# Patient Record
Sex: FEMALE | Race: BLACK OR AFRICAN AMERICAN | NOT HISPANIC OR LATINO | URBAN - METROPOLITAN AREA
[De-identification: names, ages, dates, MRNs, and addresses within clinical notes are randomized per-mention and may not be internally consistent; named-entity substitution may affect disease eponyms.]

---

## 2023-04-23 LAB
GRAM STAIN: ABNORMAL
TISSUE/WOUND CULTURE/SMEAR: ABNORMAL

## 2023-08-21 PROBLEM — E78.5 HYPERLIPIDEMIA: Status: ACTIVE | Noted: 2023-08-21

## 2023-08-21 PROBLEM — L85.3 XEROSIS OF SKIN: Status: ACTIVE | Noted: 2023-08-21

## 2023-08-21 PROBLEM — I63.9: Status: ACTIVE | Noted: 2023-08-21

## 2023-08-21 PROBLEM — E88.09 HYPOALBUMINEMIA: Status: ACTIVE | Noted: 2023-08-21

## 2023-08-21 PROBLEM — D64.9 ANEMIA: Status: ACTIVE | Noted: 2023-08-21

## 2023-08-21 PROBLEM — L84 CALLUS OF FOOT: Status: ACTIVE | Noted: 2023-08-21

## 2023-08-21 PROBLEM — R53.81 PHYSICAL DECONDITIONING: Status: ACTIVE | Noted: 2023-08-21

## 2023-08-21 PROBLEM — E55.9 VITAMIN D DEFICIENCY: Status: ACTIVE | Noted: 2023-08-21

## 2023-08-21 PROBLEM — L60.3 DYSTROPHIA UNGUIUM: Status: ACTIVE | Noted: 2023-08-21

## 2023-08-21 PROBLEM — K08.9 POOR DENTITION: Status: ACTIVE | Noted: 2023-08-21

## 2023-08-21 PROBLEM — I25.10 CAD (CORONARY ATHEROSCLEROTIC DISEASE): Status: ACTIVE | Noted: 2023-08-21

## 2023-08-21 PROBLEM — J30.2 SEASONAL ALLERGIES: Status: ACTIVE | Noted: 2023-08-21

## 2023-08-21 PROBLEM — J31.0 CHRONIC RHINITIS: Status: ACTIVE | Noted: 2023-08-21

## 2023-08-21 PROBLEM — F32.A DEPRESSION: Status: ACTIVE | Noted: 2023-08-21

## 2023-08-21 PROBLEM — J30.1 ALLERGIC RHINITIS DUE TO POLLEN: Status: ACTIVE | Noted: 2023-08-21

## 2023-08-21 PROBLEM — F02.80 ALZHEIMER'S DEMENTIA (MULTI): Status: ACTIVE | Noted: 2023-08-21

## 2023-08-21 PROBLEM — R60.0 EDEMA OF LEFT LOWER EXTREMITY: Status: ACTIVE | Noted: 2023-08-21

## 2023-08-21 PROBLEM — M21.619 BUNION: Status: ACTIVE | Noted: 2023-08-21

## 2023-08-21 PROBLEM — G30.9 ALZHEIMER'S DEMENTIA (MULTI): Status: ACTIVE | Noted: 2023-08-21

## 2023-08-21 PROBLEM — I63.9 CVA (CEREBRAL VASCULAR ACCIDENT) (MULTI): Status: ACTIVE | Noted: 2023-08-21

## 2023-08-21 PROBLEM — F19.11 HISTORY OF SUBSTANCE ABUSE (MULTI): Status: ACTIVE | Noted: 2023-08-21
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
## 2023-08-21 PROBLEM — K59.00 CONSTIPATION: Status: ACTIVE | Noted: 2023-08-21

## 2023-08-21 PROBLEM — R01.1 HEART MURMUR: Status: ACTIVE | Noted: 2023-08-21

## 2023-08-21 PROBLEM — J42 CHRONIC BRONCHITIS (MULTI): Status: ACTIVE | Noted: 2023-08-21

## 2023-08-21 PROBLEM — R06.09 DYSPNEA ON EXERTION: Status: ACTIVE | Noted: 2023-08-21

## 2023-08-21 PROBLEM — R63.0 ANOREXIA: Status: ACTIVE | Noted: 2023-08-21

## 2023-08-21 PROBLEM — R09.82 POSTNASAL DRIP: Status: ACTIVE | Noted: 2023-08-21

## 2023-08-21 PROBLEM — M85.80 OSTEOPENIA: Status: ACTIVE | Noted: 2023-08-21

## 2023-08-21 PROBLEM — R41.3 INTERMITTENT MEMORY LOSS: Status: ACTIVE | Noted: 2023-08-21

## 2023-08-21 PROBLEM — I10 BENIGN ESSENTIAL HYPERTENSION: Status: ACTIVE | Noted: 2023-08-21

## 2023-08-21 PROBLEM — E46 PROTEIN CALORIE MALNUTRITION (MULTI): Status: ACTIVE | Noted: 2023-08-21

## 2023-08-21 PROBLEM — R09.89 THROAT CLEARING: Status: ACTIVE | Noted: 2023-08-21

## 2023-08-21 PROBLEM — R79.89 ELEVATED TSH: Status: ACTIVE | Noted: 2023-08-21

## 2023-08-21 PROBLEM — G31.84 MILD COGNITIVE IMPAIRMENT: Status: ACTIVE | Noted: 2023-08-21

## 2023-08-21 PROBLEM — R26.9 IMPAIRED GAIT: Status: ACTIVE | Noted: 2023-08-21

## 2023-08-21 PROBLEM — R56.9: Status: ACTIVE | Noted: 2023-08-21

## 2023-08-21 PROBLEM — R04.0 EPISTAXIS: Status: ACTIVE | Noted: 2023-08-21

## 2023-08-21 RX ORDER — ROSUVASTATIN CALCIUM 20 MG/1
1 TABLET, COATED ORAL DAILY
COMMUNITY

## 2023-08-21 RX ORDER — ASCORBIC ACID 500 MG
1 TABLET ORAL DAILY
COMMUNITY
Start: 2017-06-25

## 2023-08-21 RX ORDER — CITALOPRAM 10 MG/1
1 TABLET ORAL DAILY
COMMUNITY
Start: 2018-05-22 | End: 2023-11-17 | Stop reason: ALTCHOICE

## 2023-08-21 RX ORDER — NAPROXEN SODIUM 220 MG/1
1 TABLET, FILM COATED ORAL DAILY
COMMUNITY

## 2023-08-21 RX ORDER — ALBUTEROL SULFATE 90 UG/1
AEROSOL, METERED RESPIRATORY (INHALATION)
COMMUNITY
Start: 2017-04-03 | End: 2023-11-17 | Stop reason: ALTCHOICE

## 2023-08-21 RX ORDER — RIVASTIGMINE TARTRATE 1.5 MG/1
1 CAPSULE ORAL 2 TIMES DAILY
COMMUNITY
Start: 2018-10-25 | End: 2023-11-17 | Stop reason: ALTCHOICE

## 2023-08-21 RX ORDER — HYDROCORTISONE 25 MG/G
1 OINTMENT TOPICAL 2 TIMES DAILY
COMMUNITY
End: 2023-11-17 | Stop reason: ALTCHOICE

## 2023-08-21 RX ORDER — FERROUS SULFATE 325(65) MG
1 TABLET ORAL 2 TIMES DAILY
COMMUNITY
Start: 2017-06-25 | End: 2023-11-17 | Stop reason: ALTCHOICE

## 2023-08-21 RX ORDER — AMMONIUM LACTATE 12 G/100G
1 CREAM TOPICAL 2 TIMES DAILY PRN
COMMUNITY
End: 2023-11-17 | Stop reason: ALTCHOICE

## 2023-08-21 RX ORDER — LORATADINE 10 MG/1
10 TABLET ORAL AS NEEDED
COMMUNITY
Start: 2016-06-13 | End: 2023-11-17 | Stop reason: ALTCHOICE

## 2023-08-21 RX ORDER — ACETAMINOPHEN 500 MG
50 TABLET ORAL DAILY
COMMUNITY

## 2023-08-21 RX ORDER — LACTOSE-REDUCED FOOD 0.04G-1/ML
LIQUID (ML) ORAL
COMMUNITY
Start: 2018-10-25

## 2023-10-02 ENCOUNTER — OFFICE VISIT (OUTPATIENT)
Dept: VASCULAR SURGERY | Facility: HOSPITAL | Age: 83
End: 2023-10-02
Payer: MEDICARE

## 2023-10-02 VITALS — DIASTOLIC BLOOD PRESSURE: 62 MMHG | SYSTOLIC BLOOD PRESSURE: 99 MMHG

## 2023-10-02 DIAGNOSIS — I83.025: Primary | ICD-10-CM

## 2023-10-02 DIAGNOSIS — L97.521: Primary | ICD-10-CM

## 2023-10-02 PROCEDURE — 1159F MED LIST DOCD IN RCRD: CPT | Performed by: NURSE PRACTITIONER

## 2023-10-02 PROCEDURE — 3078F DIAST BP <80 MM HG: CPT | Performed by: NURSE PRACTITIONER

## 2023-10-02 PROCEDURE — 99214 OFFICE O/P EST MOD 30 MIN: CPT | Performed by: NURSE PRACTITIONER

## 2023-10-02 PROCEDURE — 99204 OFFICE O/P NEW MOD 45 MIN: CPT | Performed by: NURSE PRACTITIONER

## 2023-10-02 PROCEDURE — 3074F SYST BP LT 130 MM HG: CPT | Performed by: NURSE PRACTITIONER

## 2023-10-02 RX ORDER — POTASSIUM &MAGNESIUM ASPARTATE 250-250 MG
500 CAPSULE ORAL DAILY
COMMUNITY

## 2023-10-02 RX ORDER — DOCUSATE SODIUM 100 MG/1
100 CAPSULE, LIQUID FILLED ORAL DAILY PRN
COMMUNITY

## 2023-10-02 RX ORDER — CETIRIZINE HYDROCHLORIDE 10 MG/1
10 TABLET, CHEWABLE ORAL DAILY
COMMUNITY
End: 2023-11-17

## 2023-10-02 RX ORDER — POLYETHYLENE GLYCOL 3350 17 G/17G
17 POWDER, FOR SOLUTION ORAL DAILY PRN
COMMUNITY
End: 2023-11-17

## 2023-10-02 ASSESSMENT — ENCOUNTER SYMPTOMS
PALPITATIONS: 0
FATIGUE: 0
ABDOMINAL PAIN: 0
FEVER: 0
ADENOPATHY: 0
SHORTNESS OF BREATH: 0
NERVOUS/ANXIOUS: 0
NUMBNESS: 0
UNEXPECTED WEIGHT CHANGE: 0
DIARRHEA: 0
CHILLS: 0
VOMITING: 0
NAUSEA: 0
AGITATION: 0
COUGH: 0
FLANK PAIN: 0
CHEST TIGHTNESS: 0
HEADACHES: 0
ARTHRALGIAS: 0
DIZZINESS: 0
WEAKNESS: 0

## 2023-10-02 NOTE — PROGRESS NOTES
History Of Present Illness  Kate Shah is a 83 y.o. female presenting with venous stasis ulcer of left great toe. The patient is unsure as to how long she has had an ulcer in her foot and was not aware that she had any issues with her feet. She states that she goes to a podiatrist for foot care every 2 weeks. The patient presents to the clinic in a wheelchair, however, however she says that she is able to walk short distances. She denies any claudication, or rest pain in the lower extremities. She is not currently experiencing any chest pain, shortness of breath, fever, chills, malaise, nausea or vomiting. The patient currently lives in a skilled nursing facility.     Past Medical History  She has a past medical history of Chronic obstructive pulmonary disease, unspecified (CMS/HCC) (06/18/2013), Nicotine dependence, unspecified, uncomplicated (06/18/2013), and Personal history of transient ischemic attack (TIA), and cerebral infarction without residual deficits.     Surgical History  She has a past surgical history that includes Foot surgery (11/15/2016) and Colonoscopy (11/06/2014).     Social History  She has no history on file for tobacco use, alcohol use, and drug use.     Family History  Family History          Family History   Problem Relation Name Age of Onset    No Known Problems Mother        No Known Problems Father                Allergies  Penicillins     Review of Systems   Constitutional:  Negative for chills, fatigue, fever and unexpected weight change.   Respiratory:  Negative for cough, chest tightness and shortness of breath.    Cardiovascular:  Negative for chest pain and palpitations.   Gastrointestinal:  Negative for abdominal pain, diarrhea, nausea and vomiting.   Genitourinary:  Negative for flank pain.   Musculoskeletal:  Negative for arthralgias.   Skin:  Negative for rash.   Neurological:  Negative for dizziness, weakness, numbness and headaches.   Hematological:  Negative for  adenopathy.   Psychiatric/Behavioral:  Negative for agitation. The patient is not nervous/anxious.          Physical Exam  HENT:      Head: Normocephalic and atraumatic.   Eyes:      General: No scleral icterus.  Neck:      Vascular: No carotid bruit.   Cardiovascular:      Rate and Rhythm: Normal rate and regular rhythm.      Heart sounds: No murmur heard.     No gallop.      Comments: Vascular: No peripheral edema present. Palpable radial pulses bilaterally. Palpable DP pulses bilaterally.  Pulmonary:      Effort: Pulmonary effort is normal.      Breath sounds: Normal breath sounds.   Abdominal:      General: Bowel sounds are normal.      Palpations: Abdomen is soft.   Skin:     General: Skin is dry.      Findings: Lesion present.      Comments: Left foot: Multiple areas of dry, flaky skin with dark discolorations. Left great toe with dry scabs present.   Neurological:      General: No focal deficit present.   Psychiatric:         Mood and Affect: Mood normal.         Behavior: Behavior normal.            Last Recorded Vitals  Blood pressure 99/62.     Relevant Results  Scheduled medications     Continuous medications     PRN medications      Assessment/Plan   Diagnoses and all orders for this visit:  Venous stasis ulcer of other part of left foot limited to breakdown of skin, unspecified whether varicose veins present (CMS/HCC)  -     Vascular US ankle brachial index (ROSELINE) without exercise; Future     -Obtain PVR to check blood flow to bilateral lower extremities  -Office visit after testing to discuss results  -Continue appointments with podiatry for foot care  -Keep feet clean and dry  -Wear protective socks/shoes to prevent at all times  -Follow up sooner if needed     I spent 55 minutes in the professional and overall care of this patient.        CLAIRE Cronin-CNP

## 2023-10-02 NOTE — H&P
History Of Present Illness  Kate Shah is a 83 y.o. female presenting with venous stasis ulcer of left great toe. The patient is unsure as to how long she has had an ulcer in her foot and was not aware that she had any issues with her feet. She states that she goes to a podiatrist for foot care every 2 weeks. The patient presents to the clinic in a wheelchair, however, however she says that she is able to walk short distances. She denies any claudication, or rest pain in the lower extremities. She is not currently experiencing any chest pain, shortness of breath, fever, chills, malaise, nausea or vomiting. The patient currently lives in a skilled nursing facility.     Past Medical History  She has a past medical history of Chronic obstructive pulmonary disease, unspecified (CMS/[facility]) (06/18/2013), Nicotine dependence, unspecified, uncomplicated (06/18/2013), and Personal history of transient ischemic attack (TIA), and cerebral infarction without residual deficits.    Surgical History  She has a past surgical history that includes Foot surgery (11/15/2016) and Colonoscopy (11/06/2014).     Social History  She has no history on file for tobacco use, alcohol use, and drug use.    Family History  Family History   Problem Relation Name Age of Onset    No Known Problems Mother      No Known Problems Father          Allergies  Penicillins    Review of Systems   Constitutional:  Negative for chills, fatigue, fever and unexpected weight change.   Respiratory:  Negative for cough, chest tightness and shortness of breath.    Cardiovascular:  Negative for chest pain and palpitations.   Gastrointestinal:  Negative for abdominal pain, diarrhea, nausea and vomiting.   Genitourinary:  Negative for flank pain.   Musculoskeletal:  Negative for arthralgias.   Skin:  Negative for rash.   Neurological:  Negative for dizziness, weakness, numbness and headaches.   Hematological:  Negative for adenopathy.   Psychiatric/Behavioral:   Negative for agitation. The patient is not nervous/anxious.         Physical Exam  HENT:      Head: Normocephalic and atraumatic.   Eyes:      General: No scleral icterus.  Neck:      Vascular: No carotid bruit.   Cardiovascular:      Rate and Rhythm: Normal rate and regular rhythm.      Heart sounds: No murmur heard.     No gallop.      Comments: Vascular: No peripheral edema present. Palpable radial pulses bilaterally. Palpable DP pulses bilaterally.  Pulmonary:      Effort: Pulmonary effort is normal.      Breath sounds: Normal breath sounds.   Abdominal:      General: Bowel sounds are normal.      Palpations: Abdomen is soft.   Skin:     General: Skin is dry.      Findings: Lesion present.      Comments: Left foot: Multiple areas of dry, flaky skin with dark discolorations. Left great toe with dry scabs present.   Neurological:      General: No focal deficit present.   Psychiatric:         Mood and Affect: Mood normal.         Behavior: Behavior normal.          Last Recorded Vitals  Blood pressure 99/62.    Relevant Results  Scheduled medications    Continuous medications    PRN medications      Assessment/Plan   Diagnoses and all orders for this visit:  Venous stasis ulcer of other part of left foot limited to breakdown of skin, unspecified whether varicose veins present (CMS/HCC)  -     Vascular US ankle brachial index (ROSELINE) without exercise; Future    -Obtain PVR to check blood flow to bilateral lower extremities  -Office visit after testing to discuss results  -Continue appointments with podiatry for foot care  -Keep feet clean and dry  -Wear protective socks/shoes to prevent at all times  -Follow up sooner if needed       I spent 55 minutes in the professional and overall care of this patient.      Valerie Hope APRN-CNP

## 2023-10-02 NOTE — PATIENT INSTRUCTIONS
-Obtain ROSELINE/PVRs to check blood flow to bilateral lower extremities  -Office visit after testing to discuss results  -Continue appointments with podiatry for foot care  -Keep feet clean and dry  -Wear protective socks/shoes to prevent at all times  -Follow up sooner if needed

## 2023-10-30 DIAGNOSIS — L97.521: Primary | ICD-10-CM

## 2023-10-30 DIAGNOSIS — I83.025: Primary | ICD-10-CM

## 2023-11-16 ENCOUNTER — APPOINTMENT (OUTPATIENT)
Dept: RADIOLOGY | Facility: HOSPITAL | Age: 83
DRG: 315 | End: 2023-11-16
Payer: MEDICARE

## 2023-11-16 ENCOUNTER — HOSPITAL ENCOUNTER (OUTPATIENT)
Facility: HOSPITAL | Age: 83
Setting detail: OBSERVATION
LOS: 1 days | Discharge: HOME | DRG: 315 | End: 2023-11-18
Attending: EMERGENCY MEDICINE | Admitting: INTERNAL MEDICINE
Payer: MEDICARE

## 2023-11-16 ENCOUNTER — CLINICAL SUPPORT (OUTPATIENT)
Dept: EMERGENCY MEDICINE | Facility: HOSPITAL | Age: 83
DRG: 315 | End: 2023-11-16
Payer: MEDICARE

## 2023-11-16 DIAGNOSIS — R40.20 LOSS OF CONSCIOUSNESS (MULTI): ICD-10-CM

## 2023-11-16 DIAGNOSIS — N39.0 URINARY TRACT INFECTION WITHOUT HEMATURIA, SITE UNSPECIFIED: ICD-10-CM
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
## 2023-11-16 DIAGNOSIS — R55 SYNCOPE, UNSPECIFIED SYNCOPE TYPE: Primary | ICD-10-CM

## 2023-11-16 LAB
ALBUMIN SERPL BCP-MCNC: 3.7 G/DL (ref 3.4–5)
ALP SERPL-CCNC: 61 U/L (ref 33–136)
ALT SERPL W P-5'-P-CCNC: 12 U/L (ref 7–45)
ANION GAP BLDV CALCULATED.4IONS-SCNC: 7 MMOL/L (ref 10–25)
ANION GAP SERPL CALC-SCNC: 14 MMOL/L (ref 10–20)
APPEARANCE UR: ABNORMAL
AST SERPL W P-5'-P-CCNC: 26 U/L (ref 9–39)
ATRIAL RATE: 66 BPM
BASE EXCESS BLDV CALC-SCNC: 3.7 MMOL/L (ref -2–3)
BASOPHILS # BLD AUTO: 0.05 X10*3/UL (ref 0–0.1)
BASOPHILS NFR BLD AUTO: 0.7 %
BILIRUB SERPL-MCNC: 0.4 MG/DL (ref 0–1.2)
BILIRUB UR STRIP.AUTO-MCNC: NEGATIVE MG/DL
BODY TEMPERATURE: 37 DEGREES CELSIUS
BUN SERPL-MCNC: 20 MG/DL (ref 6–23)
CA-I BLDV-SCNC: 1.3 MMOL/L (ref 1.1–1.33)
CALCIUM SERPL-MCNC: 9.9 MG/DL (ref 8.6–10.6)
CARDIAC TROPONIN I PNL SERPL HS: 37 NG/L (ref 0–34)
CARDIAC TROPONIN I PNL SERPL HS: 42 NG/L (ref 0–34)
CARDIAC TROPONIN I PNL SERPL HS: 45 NG/L (ref 0–34)
CHLORIDE BLDV-SCNC: 106 MMOL/L (ref 98–107)
CHLORIDE SERPL-SCNC: 106 MMOL/L (ref 98–107)
CO2 SERPL-SCNC: 27 MMOL/L (ref 21–32)
COLOR UR: YELLOW
CREAT SERPL-MCNC: 0.92 MG/DL (ref 0.5–1.05)
EOSINOPHIL # BLD AUTO: 0.43 X10*3/UL (ref 0–0.4)
EOSINOPHIL NFR BLD AUTO: 6.3 %
ERYTHROCYTE [DISTWIDTH] IN BLOOD BY AUTOMATED COUNT: 13.8 % (ref 11.5–14.5)
GFR SERPL CREATININE-BSD FRML MDRD: 63 ML/MIN/1.73M*2
GLUCOSE BLDV-MCNC: 96 MG/DL (ref 74–99)
GLUCOSE SERPL-MCNC: 89 MG/DL (ref 74–99)
GLUCOSE UR STRIP.AUTO-MCNC: NEGATIVE MG/DL
HCO3 BLDV-SCNC: 30.9 MMOL/L (ref 22–26)
HCT VFR BLD AUTO: 43.6 % (ref 36–46)
HCT VFR BLD EST: 45 % (ref 36–46)
HGB BLD-MCNC: 14.1 G/DL (ref 12–16)
HGB BLDV-MCNC: 15.1 G/DL (ref 12–16)
HOLD SPECIMEN: NORMAL
IMM GRANULOCYTES # BLD AUTO: 0.01 X10*3/UL (ref 0–0.5)
IMM GRANULOCYTES NFR BLD AUTO: 0.1 % (ref 0–0.9)
KETONES UR STRIP.AUTO-MCNC: NEGATIVE MG/DL
LACTATE BLDV-SCNC: 2 MMOL/L (ref 0.4–2)
LACTATE SERPL-SCNC: 1.1 MMOL/L (ref 0.4–2)
LACTATE SERPL-SCNC: 2.2 MMOL/L (ref 0.4–2)
LEUKOCYTE ESTERASE UR QL STRIP.AUTO: NEGATIVE
LYMPHOCYTES # BLD AUTO: 1.22 X10*3/UL (ref 0.8–3)
LYMPHOCYTES NFR BLD AUTO: 17.9 %
MCH RBC QN AUTO: 31.5 PG (ref 26–34)
MCHC RBC AUTO-ENTMCNC: 32.3 G/DL (ref 32–36)
MCV RBC AUTO: 97 FL (ref 80–100)
MONOCYTES # BLD AUTO: 1.37 X10*3/UL (ref 0.05–0.8)
MONOCYTES NFR BLD AUTO: 20.1 %
NEUTROPHILS # BLD AUTO: 3.74 X10*3/UL (ref 1.6–5.5)
NEUTROPHILS NFR BLD AUTO: 54.9 %
NITRITE UR QL STRIP.AUTO: NEGATIVE
NRBC BLD-RTO: 0 /100 WBCS (ref 0–0)
OXYHGB MFR BLDV: 27.7 % (ref 45–75)
P AXIS: 19 DEGREES
P OFFSET: 194 MS
P ONSET: 137 MS
PCO2 BLDV: 56 MM HG (ref 41–51)
PH BLDV: 7.35 PH (ref 7.33–7.43)
PH UR STRIP.AUTO: 5 [PH]
PLATELET # BLD AUTO: 125 X10*3/UL (ref 150–450)
PO2 BLDV: 25 MM HG (ref 35–45)
POTASSIUM BLDV-SCNC: 3.6 MMOL/L (ref 3.5–5.3)
POTASSIUM SERPL-SCNC: 3.8 MMOL/L (ref 3.5–5.3)
PR INTERVAL: 172 MS
PROT SERPL-MCNC: 7.2 G/DL (ref 6.4–8.2)
PROT UR STRIP.AUTO-MCNC: ABNORMAL MG/DL
Q ONSET: 223 MS
QRS COUNT: 11 BEATS
QRS DURATION: 90 MS
QT INTERVAL: 416 MS
QTC CALCULATION(BAZETT): 436 MS
QTC FREDERICIA: 429 MS
R AXIS: 35 DEGREES
RBC # BLD AUTO: 4.48 X10*6/UL (ref 4–5.2)
RBC # UR STRIP.AUTO: NEGATIVE /UL
RBC #/AREA URNS AUTO: NORMAL /HPF
SAO2 % BLDV: 28 % (ref 45–75)
SODIUM BLDV-SCNC: 140 MMOL/L (ref 136–145)
SODIUM SERPL-SCNC: 143 MMOL/L (ref 136–145)
SP GR UR STRIP.AUTO: 1.02
SQUAMOUS #/AREA URNS AUTO: NORMAL /HPF
T AXIS: 136 DEGREES
T OFFSET: 431 MS
UROBILINOGEN UR STRIP.AUTO-MCNC: 2 MG/DL
VENTRICULAR RATE: 66 BPM
WBC # BLD AUTO: 6.8 X10*3/UL (ref 4.4–11.3)
WBC #/AREA URNS AUTO: NORMAL /HPF

## 2023-11-16 PROCEDURE — 96375 TX/PRO/DX INJ NEW DRUG ADDON: CPT | Performed by: EMERGENCY MEDICINE

## 2023-11-16 PROCEDURE — 84132 ASSAY OF SERUM POTASSIUM: CPT | Performed by: STUDENT IN AN ORGANIZED HEALTH CARE EDUCATION/TRAINING PROGRAM

## 2023-11-16 PROCEDURE — 84484 ASSAY OF TROPONIN QUANT: CPT | Mod: 91 | Performed by: STUDENT IN AN ORGANIZED HEALTH CARE EDUCATION/TRAINING PROGRAM

## 2023-11-16 PROCEDURE — 2500000001 HC RX 250 WO HCPCS SELF ADMINISTERED DRUGS (ALT 637 FOR MEDICARE OP): Mod: SE

## 2023-11-16 PROCEDURE — 2500000004 HC RX 250 GENERAL PHARMACY W/ HCPCS (ALT 636 FOR OP/ED): Mod: SE | Performed by: STUDENT IN AN ORGANIZED HEALTH CARE EDUCATION/TRAINING PROGRAM

## 2023-11-16 PROCEDURE — 71045 X-RAY EXAM CHEST 1 VIEW: CPT | Performed by: STUDENT IN AN ORGANIZED HEALTH CARE EDUCATION/TRAINING PROGRAM

## 2023-11-16 PROCEDURE — 96372 THER/PROPH/DIAG INJ SC/IM: CPT | Mod: 59

## 2023-11-16 PROCEDURE — 81001 URINALYSIS AUTO W/SCOPE: CPT | Performed by: STUDENT IN AN ORGANIZED HEALTH CARE EDUCATION/TRAINING PROGRAM

## 2023-11-16 PROCEDURE — 84484 ASSAY OF TROPONIN QUANT: CPT

## 2023-11-16 PROCEDURE — 99222 1ST HOSP IP/OBS MODERATE 55: CPT | Performed by: INTERNAL MEDICINE

## 2023-11-16 PROCEDURE — 2500000004 HC RX 250 GENERAL PHARMACY W/ HCPCS (ALT 636 FOR OP/ED): Mod: SE

## 2023-11-16 PROCEDURE — 84132 ASSAY OF SERUM POTASSIUM: CPT

## 2023-11-16 PROCEDURE — 93010 ELECTROCARDIOGRAM REPORT: CPT | Performed by: EMERGENCY MEDICINE

## 2023-11-16 PROCEDURE — 96361 HYDRATE IV INFUSION ADD-ON: CPT | Performed by: EMERGENCY MEDICINE

## 2023-11-16 PROCEDURE — 83605 ASSAY OF LACTIC ACID: CPT | Performed by: STUDENT IN AN ORGANIZED HEALTH CARE EDUCATION/TRAINING PROGRAM

## 2023-11-16 PROCEDURE — 93005 ELECTROCARDIOGRAM TRACING: CPT

## 2023-11-16 PROCEDURE — 99285 EMERGENCY DEPT VISIT HI MDM: CPT | Performed by: EMERGENCY MEDICINE

## 2023-11-16 PROCEDURE — 71045 X-RAY EXAM CHEST 1 VIEW: CPT | Mod: FY

## 2023-11-16 PROCEDURE — 96365 THER/PROPH/DIAG IV INF INIT: CPT | Performed by: EMERGENCY MEDICINE

## 2023-11-16 PROCEDURE — 36415 COLL VENOUS BLD VENIPUNCTURE: CPT | Performed by: STUDENT IN AN ORGANIZED HEALTH CARE EDUCATION/TRAINING PROGRAM

## 2023-11-16 PROCEDURE — 82550 ASSAY OF CK (CPK): CPT | Performed by: STUDENT IN AN ORGANIZED HEALTH CARE EDUCATION/TRAINING PROGRAM

## 2023-11-16 PROCEDURE — G0378 HOSPITAL OBSERVATION PER HR: HCPCS

## 2023-11-16 PROCEDURE — 96367 TX/PROPH/DG ADDL SEQ IV INF: CPT

## 2023-11-16 PROCEDURE — 96365 THER/PROPH/DIAG IV INF INIT: CPT

## 2023-11-16 PROCEDURE — 1200000002 HC GENERAL ROOM WITH TELEMETRY DAILY

## 2023-11-16 PROCEDURE — 99285 EMERGENCY DEPT VISIT HI MDM: CPT | Mod: 25 | Performed by: EMERGENCY MEDICINE

## 2023-11-16 PROCEDURE — 2500000001 HC RX 250 WO HCPCS SELF ADMINISTERED DRUGS (ALT 637 FOR MEDICARE OP): Mod: SE | Performed by: STUDENT IN AN ORGANIZED HEALTH CARE EDUCATION/TRAINING PROGRAM

## 2023-11-16 PROCEDURE — 85025 COMPLETE CBC W/AUTO DIFF WBC: CPT | Performed by: STUDENT IN AN ORGANIZED HEALTH CARE EDUCATION/TRAINING PROGRAM

## 2023-11-16 PROCEDURE — 87040 BLOOD CULTURE FOR BACTERIA: CPT | Performed by: STUDENT IN AN ORGANIZED HEALTH CARE EDUCATION/TRAINING PROGRAM

## 2023-11-16 PROCEDURE — 96361 HYDRATE IV INFUSION ADD-ON: CPT

## 2023-11-16 RX ORDER — CALCIUM CARBONATE 500(1250)
500 TABLET,CHEWABLE ORAL 4 TIMES DAILY PRN
Status: DISCONTINUED | OUTPATIENT
Start: 2023-11-16 | End: 2023-11-16

## 2023-11-16 RX ORDER — ACETAMINOPHEN 325 MG/1
650 TABLET ORAL EVERY 4 HOURS PRN
Status: DISCONTINUED | OUTPATIENT
Start: 2023-11-16 | End: 2023-11-18 | Stop reason: HOSPADM

## 2023-11-16 RX ORDER — PANTOPRAZOLE SODIUM 40 MG/1
40 TABLET, DELAYED RELEASE ORAL
Status: DISCONTINUED | OUTPATIENT
Start: 2023-11-17 | End: 2023-11-18 | Stop reason: HOSPADM

## 2023-11-16 RX ORDER — NAPROXEN SODIUM 220 MG/1
324 TABLET, FILM COATED ORAL ONCE
Status: COMPLETED | OUTPATIENT
Start: 2023-11-16 | End: 2023-11-16

## 2023-11-16 RX ORDER — HEPARIN SODIUM 5000 [USP'U]/ML
5000 INJECTION, SOLUTION INTRAVENOUS; SUBCUTANEOUS EVERY 8 HOURS
Status: DISCONTINUED | OUTPATIENT
Start: 2023-11-16 | End: 2023-11-18 | Stop reason: HOSPADM

## 2023-11-16 RX ORDER — ASCORBIC ACID 500 MG
500 TABLET ORAL DAILY
Status: DISCONTINUED | OUTPATIENT
Start: 2023-11-16 | End: 2023-11-18 | Stop reason: HOSPADM

## 2023-11-16 RX ORDER — RIVASTIGMINE TARTRATE 1.5 MG/1
1.5 CAPSULE ORAL 2 TIMES DAILY
Status: DISCONTINUED | OUTPATIENT
Start: 2023-11-16 | End: 2023-11-16

## 2023-11-16 RX ORDER — ACETAMINOPHEN 160 MG/5ML
650 SOLUTION ORAL EVERY 4 HOURS PRN
Status: DISCONTINUED | OUTPATIENT
Start: 2023-11-16 | End: 2023-11-18 | Stop reason: HOSPADM

## 2023-11-16 RX ORDER — CALCIUM CARBONATE 200(500)MG
500 TABLET,CHEWABLE ORAL 4 TIMES DAILY PRN
Status: DISCONTINUED | OUTPATIENT
Start: 2023-11-16 | End: 2023-11-18 | Stop reason: HOSPADM

## 2023-11-16 RX ORDER — CEFEPIME 1 G/50ML
2 INJECTION, SOLUTION INTRAVENOUS ONCE
Status: COMPLETED | OUTPATIENT
Start: 2023-11-16 | End: 2023-11-16

## 2023-11-16 RX ORDER — POLYETHYLENE GLYCOL 3350 17 G/17G
17 POWDER, FOR SOLUTION ORAL DAILY
Status: DISCONTINUED | OUTPATIENT
Start: 2023-11-16 | End: 2023-11-18 | Stop reason: HOSPADM

## 2023-11-16 RX ORDER — METRONIDAZOLE 500 MG/100ML
500 INJECTION, SOLUTION INTRAVENOUS ONCE
Status: COMPLETED | OUTPATIENT
Start: 2023-11-16 | End: 2023-11-16

## 2023-11-16 RX ORDER — VANCOMYCIN HYDROCHLORIDE 1 G/200ML
1 INJECTION, SOLUTION INTRAVENOUS ONCE
Status: COMPLETED | OUTPATIENT
Start: 2023-11-16 | End: 2023-11-16

## 2023-11-16 RX ORDER — GUAIFENESIN 600 MG/1
600 TABLET, EXTENDED RELEASE ORAL EVERY 12 HOURS PRN
Status: DISCONTINUED | OUTPATIENT
Start: 2023-11-16 | End: 2023-11-18 | Stop reason: HOSPADM

## 2023-11-16 RX ORDER — ACETAMINOPHEN 650 MG/1
650 SUPPOSITORY RECTAL EVERY 4 HOURS PRN
Status: DISCONTINUED | OUTPATIENT
Start: 2023-11-16 | End: 2023-11-18 | Stop reason: HOSPADM

## 2023-11-16 RX ORDER — AMMONIUM LACTATE 12 G/100G
1 CREAM TOPICAL 2 TIMES DAILY PRN
Status: DISCONTINUED | OUTPATIENT
Start: 2023-11-16 | End: 2023-11-18 | Stop reason: HOSPADM

## 2023-11-16 RX ORDER — ROSUVASTATIN CALCIUM 20 MG/1
20 TABLET, COATED ORAL NIGHTLY
Status: DISCONTINUED | OUTPATIENT
Start: 2023-11-16 | End: 2023-11-18 | Stop reason: HOSPADM

## 2023-11-16 RX ORDER — ALBUTEROL SULFATE 0.83 MG/ML
2.5 SOLUTION RESPIRATORY (INHALATION) EVERY 6 HOURS PRN
Status: DISCONTINUED | OUTPATIENT
Start: 2023-11-16 | End: 2023-11-18 | Stop reason: HOSPADM

## 2023-11-16 RX ORDER — NAPROXEN SODIUM 220 MG/1
81 TABLET, FILM COATED ORAL DAILY
Status: DISCONTINUED | OUTPATIENT
Start: 2023-11-16 | End: 2023-11-18 | Stop reason: HOSPADM

## 2023-11-16 RX ORDER — PANTOPRAZOLE SODIUM 40 MG/10ML
40 INJECTION, POWDER, LYOPHILIZED, FOR SOLUTION INTRAVENOUS
Status: DISCONTINUED | OUTPATIENT
Start: 2023-11-17 | End: 2023-11-18 | Stop reason: HOSPADM

## 2023-11-16 RX ORDER — CHOLECALCIFEROL (VITAMIN D3) 25 MCG
2000 TABLET ORAL DAILY
Status: DISCONTINUED | OUTPATIENT
Start: 2023-11-16 | End: 2023-11-18 | Stop reason: HOSPADM

## 2023-11-16 RX ADMIN — CEFEPIME 2 G: 1 INJECTION, SOLUTION INTRAVENOUS at 12:31

## 2023-11-16 RX ADMIN — HEPARIN SODIUM 5000 UNITS: 5000 INJECTION INTRAVENOUS; SUBCUTANEOUS at 18:30

## 2023-11-16 RX ADMIN — ASPIRIN 81 MG CHEWABLE TABLET 324 MG: 81 TABLET CHEWABLE at 14:20

## 2023-11-16 RX ADMIN — CALCIUM CARBONATE (ANTACID) CHEW TAB 500 MG 500 MG: 500 CHEW TAB at 18:30

## 2023-11-16 RX ADMIN — METRONIDAZOLE 500 MG: 500 INJECTION, SOLUTION INTRAVENOUS at 13:09

## 2023-11-16 RX ADMIN — VANCOMYCIN HYDROCHLORIDE 1 G: 1 INJECTION, SOLUTION INTRAVENOUS at 14:18

## 2023-11-16 RX ADMIN — ROSUVASTATIN CALCIUM 20 MG: 20 TABLET, FILM COATED ORAL at 21:22

## 2023-11-16 RX ADMIN — Medication 2000 UNITS: at 21:23

## 2023-11-16 RX ADMIN — OXYCODONE HYDROCHLORIDE AND ACETAMINOPHEN 500 MG: 500 TABLET ORAL at 18:30

## 2023-11-16 RX ADMIN — SODIUM CHLORIDE, POTASSIUM CHLORIDE, SODIUM LACTATE AND CALCIUM CHLORIDE 1000 ML: 600; 310; 30; 20 INJECTION, SOLUTION INTRAVENOUS at 12:31

## 2023-11-16 ASSESSMENT — COLUMBIA-SUICIDE SEVERITY RATING SCALE - C-SSRS
2. HAVE YOU ACTUALLY HAD ANY THOUGHTS OF KILLING YOURSELF?: NO
1. IN THE PAST MONTH, HAVE YOU WISHED YOU WERE DEAD OR WISHED YOU COULD GO TO SLEEP AND NOT WAKE UP?: NO
6. HAVE YOU EVER DONE ANYTHING, STARTED TO DO ANYTHING, OR PREPARED TO DO ANYTHING TO END YOUR LIFE?: NO

## 2023-11-16 ASSESSMENT — LIFESTYLE VARIABLES
EVER HAD A DRINK FIRST THING IN THE MORNING TO STEADY YOUR NERVES TO GET RID OF A HANGOVER: NO
EVER FELT BAD OR GUILTY ABOUT YOUR DRINKING: NO
HAVE PEOPLE ANNOYED YOU BY CRITICIZING YOUR DRINKING: NO
HAVE YOU EVER FELT YOU SHOULD CUT DOWN ON YOUR DRINKING: NO

## 2023-11-16 ASSESSMENT — PAIN SCALES - GENERAL: PAINLEVEL_OUTOF10: 0 - NO PAIN

## 2023-11-16 ASSESSMENT — PAIN - FUNCTIONAL ASSESSMENT: PAIN_FUNCTIONAL_ASSESSMENT: 0-10

## 2023-11-16 NOTE — H&P
History Of Present Illness  Kate Shah is a 83 y.o. female presenting with PMH of Dementia( Mocha score 11) TIA, HTN, and COPD .  Patient was at [facility] for daily activities when she was found to be unresponsive to verbal cues.  decision line was made by staff there to transfer the patient to ER at [facility].  Per report from patient's son and daughter-in-law patient was more tired than usual yesterday after receiving a COVID booster earlier in the day and was not able to eat her normal amount.  They reported that her appetite is better today morning and that she ate a normal breakfast.  This morning patient was at [facility] for a day program that she has been attending she was participating in a group activity when she was found to be unresponsive in a wheelchair.  Patient was not easily arousable and was found to have a BP of 71/46 and a heart rate of 48.  Pulse ox was not able to gauge oxygenation.  Staff denied any seizure-like activities urination tongue biting or other concerning signs.  Patient was transferred to ED.  In the ED patient was found to have cold extremities and new pulse ox was satting in the high 90s patient was quickly weaned from nonrebreather to 2 L nasal cannula.     Patient is a very poor historian giving multiple recantations of same event. She only remembers waking up on the floor.    Review of symptoms negative for nausea vomiting fever cough chills night sweats recent weakness or changes in functionality.    Patient was recently recently seen in the emergency department of [facility] and was found to have a UTI that was treated with Bactrim patient was then discharged to [facility] and resided there for 5 days.    ED interventions: Vancomycin, metronidazole, cefepime, aspirin 324, 1 L LR.    ED imaging: EKG not significant for ischemic changes.    ED Labs: RFP within normal limits . CBC within normal limits barring platelets of 125.  High-sensitivity troponin elevated at  37 trending upwards or towards 45.  VBG showing a pH of 7.35 and a PCO2 of 56 and a PaO2 of 28 and a lactate of 2.2.  Repeat lactate was 1.1.  Urine analysis was bland         Past Medical History  Past Medical History:   Diagnosis Date    Chronic obstructive pulmonary disease, unspecified (CMS/HCC) 06/18/2013    Chronic obstructive pulmonary disease    Nicotine dependence, unspecified, uncomplicated 06/18/2013    Nicotine dependence    Personal history of transient ischemic attack (TIA), and cerebral infarction without residual deficits     History of stroke       Surgical History  Past Surgical History:   Procedure Laterality Date    COLONOSCOPY  11/06/2014    Colonoscopy (Fiberoptic)    FOOT SURGERY  11/15/2016    Foot Surgery        Social History  She has no history on file for tobacco use, alcohol use, and drug use.  Patient claimed that she has been smoking a pack per day for the past 10 years.  And that she consumes white powder for recreational drugs however patient is a very poor historian.  Patient lives with her son and daughter-in-law and attends a day program at [facility].  Patient is not independent in activities of daily living.    Family History  Family History   Problem Relation Name Age of Onset    No Known Problems Mother      No Known Problems Father          Allergies  Penicillins    Review of Systems     Physical Exam   Constitutional: Well-developed female in no acute distress.  HEENT: Normocephalic, atraumatic. PERRL. EOMI. No cervical lymphadenopathy.  Respiratory: CTA bilaterally. No wheezes, rales, or rhonchi. Normal respiratory effort.  Cardiovascular: RRR. No murmurs, gallops, or rubs. No JVD. Radial pulses 2+.  Abdominal: Soft, nondistended, nontender to palpation. Bowel sounds present. No hepatosplenomegaly or masses. No CVA tenderness.  Neuro: CN II-XII intact.   MSK: No LE edema bilaterally.  Skin: Warm, dry. No rashes or wounds.  Psych: Appropriate mood and affect.    Last  "Recorded Vitals  Blood pressure 113/85, pulse 70, temperature 36.5 °C (97.7 °F), temperature source Tympanic, resp. rate (!) 9, height 1.6 m (5' 3\"), weight (!) 40.8 kg (90 lb), SpO2 98 %.    Relevant Results    Active Medications  Scheduled medications  ascorbic acid, 500 mg, oral, Daily  aspirin, 81 mg, oral, Daily  cholecalciferol, 2,000 Units, oral, Daily  heparin (porcine), 5,000 Units, subcutaneous, q8h  pantoprazole, 40 mg, oral, Daily before breakfast   Or  pantoprazole, 40 mg, intravenous, Daily before breakfast  perflutren lipid microspheres, 0.5-10 mL of dilution, intravenous, Once in imaging  perflutren protein A microsphere, 0.5 mL, intravenous, Once in imaging  polyethylene glycol, 17 g, oral, Daily  rosuvastatin, 20 mg, oral, Nightly  sulfur hexafluoride microsphr, 2 mL, intravenous, Once in imaging      Continuous medications     PRN medications  PRN medications: acetaminophen **OR** acetaminophen **OR** acetaminophen, albuterol, ammonium lactate, benzocaine-menthol, calcium carbonate, guaiFENesin     Recent Labs  Results for orders placed or performed during the hospital encounter of 11/16/23 (from the past 24 hour(s))   Blood Gas Venous Full Panel Unsolicited   Result Value Ref Range    POCT pH, Venous 7.35 7.33 - 7.43 pH    POCT pCO2, Venous 56 (H) 41 - 51 mm Hg    POCT pO2, Venous 25 (L) 35 - 45 mm Hg    POCT SO2, Venous 28 (L) 45 - 75 %    POCT Oxy Hemoglobin, Venous 27.7 (L) 45.0 - 75.0 %    POCT Hematocrit Calculated, Venous 45.0 36.0 - 46.0 %    POCT Sodium, Venous 140 136 - 145 mmol/L    POCT Potassium, Venous 3.6 3.5 - 5.3 mmol/L    POCT Chloride, Venous 106 98 - 107 mmol/L    POCT Ionized Calicum, Venous 1.30 1.10 - 1.33 mmol/L    POCT Glucose, Venous 96 74 - 99 mg/dL    POCT Lactate, Venous 2.0 0.4 - 2.0 mmol/L    POCT Base Excess, Venous 3.7 (H) -2.0 - 3.0 mmol/L    POCT HCO3 Calculated, Venous 30.9 (H) 22.0 - 26.0 mmol/L    POCT Hemoglobin, Venous 15.1 12.0 - 16.0 g/dL    POCT Anion " Gap, Venous 7.0 (L) 10.0 - 25.0 mmol/L    Patient Temperature 37.0 degrees Celsius   CBC and Auto Differential   Result Value Ref Range    WBC 6.8 4.4 - 11.3 x10*3/uL    nRBC 0.0 0.0 - 0.0 /100 WBCs    RBC 4.48 4.00 - 5.20 x10*6/uL    Hemoglobin 14.1 12.0 - 16.0 g/dL    Hematocrit 43.6 36.0 - 46.0 %    MCV 97 80 - 100 fL    MCH 31.5 26.0 - 34.0 pg    MCHC 32.3 32.0 - 36.0 g/dL    RDW 13.8 11.5 - 14.5 %    Platelets 125 (L) 150 - 450 x10*3/uL    Neutrophils % 54.9 40.0 - 80.0 %    Immature Granulocytes %, Automated 0.1 0.0 - 0.9 %    Lymphocytes % 17.9 13.0 - 44.0 %    Monocytes % 20.1 2.0 - 10.0 %    Eosinophils % 6.3 0.0 - 6.0 %    Basophils % 0.7 0.0 - 2.0 %    Neutrophils Absolute 3.74 1.60 - 5.50 x10*3/uL    Immature Granulocytes Absolute, Automated 0.01 0.00 - 0.50 x10*3/uL    Lymphocytes Absolute 1.22 0.80 - 3.00 x10*3/uL    Monocytes Absolute 1.37 (H) 0.05 - 0.80 x10*3/uL    Eosinophils Absolute 0.43 (H) 0.00 - 0.40 x10*3/uL    Basophils Absolute 0.05 0.00 - 0.10 x10*3/uL   Comprehensive Metabolic Panel   Result Value Ref Range    Glucose 89 74 - 99 mg/dL    Sodium 143 136 - 145 mmol/L    Potassium 3.8 3.5 - 5.3 mmol/L    Chloride 106 98 - 107 mmol/L    Bicarbonate 27 21 - 32 mmol/L    Anion Gap 14 10 - 20 mmol/L    Urea Nitrogen 20 6 - 23 mg/dL    Creatinine 0.92 0.50 - 1.05 mg/dL    eGFR 63 >60 mL/min/1.73m*2    Calcium 9.9 8.6 - 10.6 mg/dL    Albumin 3.7 3.4 - 5.0 g/dL    Alkaline Phosphatase 61 33 - 136 U/L    Total Protein 7.2 6.4 - 8.2 g/dL    AST 26 9 - 39 U/L    Bilirubin, Total 0.4 0.0 - 1.2 mg/dL    ALT 12 7 - 45 U/L   Lactate   Result Value Ref Range    Lactate 2.2 (H) 0.4 - 2.0 mmol/L   Blood Culture    Specimen: Peripheral Venipuncture; Blood culture   Result Value Ref Range    Blood Culture Loaded on Instrument - Culture in progress    Blood Culture    Specimen: Peripheral Venipuncture; Blood culture   Result Value Ref Range    Blood Culture Loaded on Instrument - Culture in progress    Troponin  I, High Sensitivity   Result Value Ref Range    Troponin I, High Sensitivity 37 (H) 0 - 34 ng/L   Light Blue Top   Result Value Ref Range    Extra Tube Hold for add-ons.    Urinalysis with Reflex Microscopic and Culture   Result Value Ref Range    Color, Urine Yellow Straw, Yellow    Appearance, Urine Hazy (N) Clear    Specific Gravity, Urine 1.021 1.005 - 1.035    pH, Urine 5.0 5.0, 5.5, 6.0, 6.5, 7.0, 7.5, 8.0    Protein, Urine 30 (1+) (N) NEGATIVE mg/dL    Glucose, Urine NEGATIVE NEGATIVE mg/dL    Blood, Urine NEGATIVE NEGATIVE    Ketones, Urine NEGATIVE NEGATIVE mg/dL    Bilirubin, Urine NEGATIVE NEGATIVE    Urobilinogen, Urine 2.0 (N) <2.0 mg/dL    Nitrite, Urine NEGATIVE NEGATIVE    Leukocyte Esterase, Urine NEGATIVE NEGATIVE   Extra Urine Gray Tube   Result Value Ref Range    Extra Tube Hold for add-ons.    Urinalysis Microscopic   Result Value Ref Range    WBC, Urine NONE 1-5, NONE /HPF    RBC, Urine 1-2 NONE, 1-2, 3-5 /HPF    Squamous Epithelial Cells, Urine 1-9 (SPARSE) Reference range not established. /HPF   Lactate   Result Value Ref Range    Lactate 1.1 0.4 - 2.0 mmol/L   Troponin I, High Sensitivity   Result Value Ref Range    Troponin I, High Sensitivity 45 (H) 0 - 34 ng/L   ECG 12 lead   Result Value Ref Range    Ventricular Rate 66 BPM    Atrial Rate 66 BPM    IA Interval 172 ms    QRS Duration 90 ms    QT Interval 416 ms    QTC Calculation(Bazett) 436 ms    P Axis 19 degrees    R Axis 35 degrees    T Axis 136 degrees    QRS Count 11 beats    Q Onset 223 ms    P Onset 137 ms    P Offset 194 ms    T Offset 431 ms    QTC Fredericia 429 ms   Troponin I, High Sensitivity   Result Value Ref Range    Troponin I, High Sensitivity 42 (H) 0 - 34 ng/L       Imaging  XR chest 1 view    Result Date: 11/16/2023  Interpreted By:  Timothy Valentin and Hanreck James STUDY: XR CHEST 1 VIEW;  11/16/2023 5:56 pm   INDICATION: Signs/Symptoms:elevated troponin, hx syncope.   COMPARISON: 09/14/2022 chest radiograph    ACCESSION NUMBER(S): [id number]   ORDERING CLINICIAN: KENY ALICEA   FINDINGS: AP radiograph of the chest was provided.     CARDIOMEDIASTINAL SILHOUETTE: Cardiomediastinal silhouette is normal in size and configuration. Aortic knob calcifications are noted.   LUNGS: Coarse interstitial lung markings and apical predominant lucencies. No focal consolidation, pleural effusion or pneumothorax.   ABDOMEN: No remarkable upper abdominal findings.   BONES: No acute osseous changes.       1.  No evidence of acute cardiopulmonary process. 2. Findings of chronic lung disease.   I personally reviewed the images/study and I agree with the resident Braydon Jordan's findings as stated. This study was interpreted at [place], [place].   MACRO: None   Signed by: Timothy Valentin 11/16/2023 6:37 PM Dictation workstation:   [id number]    ECG 12 lead    Result Date: 11/16/2023  Sinus rhythm with occasional Premature ventricular complexes Possible Anterior infarct , age undetermined ST & T wave abnormality, consider lateral ischemia Abnormal ECG When compared with ECG of 14-SEP-2022 12:23, Previous ECG has undetermined rhythm, needs review Please see ED Provider Note for formal interpretation Confirmed by Sade Mckenzie ([id number]) on 11/16/2023 4:21:34 PM           Home Meds Per Family  Vitamin D, C  Rouvastatin        Assessment/Plan   Principal Problem:    Syncope, unspecified syncope type      Kate Shah is an 83-year-old female past medical history of hypertension COPD severe dementia TIA who presented to us after an episode of loss of consciousness.  Post syncopal vital signs were notable for hypotension with BP at 71/48 and bradycardia in the 40s as well as hypoxemia.  History is notable for recent vaccine and poor oral intake.  Labs are notable for elevated trops likely type II MI in the setting of syncope and initial elevation in lactate that has normalized.  Differential  diagnosis include hypovolemia, arrhythmia, infectious process    #Syncopal episode  :: EKG showing no sign of an ischemia  :: No leukocytosis or signs of infection noted on UA  :: No seizure-like activity reported  :: No concern for any recent activity  -Status post 1 L LR  -We will obtain orthostatic vitals  -We will admit to floor with telemetry  -We will obtain TTE to evaluate heart function    #Elevated troponin  :: EKG normal  :: No chest pain history  :: Post syncopal episode  :: Likely type II MI  -Troponin 37-45-42 we will continue to trend until peak or plateau  -Status post aspirin load  - ASA 81    #History of TIA  -Continue home Rouvastatin    #History of COPD  -Albuterol as needed    #Dementia  -Patient not taking any home medications    Fluids: Status post 1 L LR   Diet: Soft bite-size  Access PIV  DVT prophylaxis Lovenox  PT OT eval ordered    Full code  NOK:  Elie Shah   [phone]           Elroy Chavez MD

## 2023-11-16 NOTE — ED TRIAGE NOTES
Pt had a syncopal episode at [facility]. Pt was found to be 55% SpO2 on room air after the event. [facility] seen for a UTI. Has a history of hypotension

## 2023-11-16 NOTE — ED PROVIDER NOTES
CC: Syncope     HPI:  Patient is a 83-year-old female with past medical history significant for COPD, dependence, history of TIA, reportedly being treated outpatient for urinary tract infection was presented to the emergency department after his syncopal episode, new oxygen requirement.  On arrival emergency department patient is necessitating 15 L nonrebreather.  Is reported by EMS the patient was satting 50% on the initially evaluated the patient however patient has cool extremities and has been difficult to obtain SPO2 with adequate waveform.  Once adequate waveform is obtained patient is noted to be satting at 100% on nonrebreather.  Patient is alert and oriented x3 reports that she is not experiencing any chest pain/shortness of breath/nausea/vomiting/fevers/chills/changes in bowel bladder function/new focal numbness or spinal arms, legs, face.  Patient reports that she does not remember what happened during reported syncopal episode.  There are no signs of significant head trauma as result of this episode.  Patient has no tenderness, no areas of pain, no reported head strike.  Patient not currently on blood thinners.  Patient reports allergy to penicillin allergy noted rash.    Records Reviewed:  Recent available ED and inpatient notes reviewed in EMR.    PMHx/PSHx:  Per HPI.   - has a past medical history of Chronic obstructive pulmonary disease, unspecified (CMS/HCC) (06/18/2013), Nicotine dependence, unspecified, uncomplicated (06/18/2013), and Personal history of transient ischemic attack (TIA), and cerebral infarction without residual deficits.  - has a past surgical history that includes Foot surgery (11/15/2016) and Colonoscopy (11/06/2014).    Medications:  Reviewed in EMR. See EMR for complete list of medications and doses.    Allergies:  Penicillins    Social History:  - Tobacco:  has no history on file for tobacco use.   - Alcohol:  has no history on file for alcohol use.   - Illicit Drugs:  has no  history on file for drug use.     ROS:  Per HPI.       ???????????????????????????????????????????????????????????????  Triage Vitals:  T 36.5 °C (97.7 °F)  HR 73  /78  RR 20  O2 100 % Supplemental oxygen    Physical Exam  Vitals and nursing note reviewed.   Constitutional:       General: She is not in acute distress.     Appearance: Normal appearance. She is ill-appearing.   HENT:      Head: Normocephalic and atraumatic.      Nose: No congestion or rhinorrhea.      Mouth/Throat:      Mouth: Mucous membranes are moist.      Pharynx: Oropharynx is clear.   Eyes:      Extraocular Movements: Extraocular movements intact.      Conjunctiva/sclera: Conjunctivae normal.      Pupils: Pupils are equal, round, and reactive to light.   Cardiovascular:      Rate and Rhythm: Normal rate and regular rhythm.      Pulses: Normal pulses.      Heart sounds: No murmur heard.     No friction rub. No gallop.   Pulmonary:      Effort: Pulmonary effort is normal.      Breath sounds: No wheezing, rhonchi or rales.   Abdominal:      General: There is no distension.      Palpations: Abdomen is soft.      Tenderness: There is no abdominal tenderness. There is no guarding or rebound.   Musculoskeletal:         General: No swelling, deformity or signs of injury. Normal range of motion.      Cervical back: Normal range of motion.      Right lower leg: No edema.      Left lower leg: No edema.   Skin:     General: Skin is warm and dry.      Capillary Refill: Capillary refill takes less than 2 seconds.      Findings: No bruising, lesion or rash.   Neurological:      General: No focal deficit present.      Mental Status: She is alert and oriented to person, place, and time. Mental status is at baseline.      Cranial Nerves: No cranial nerve deficit.      Sensory: No sensory deficit.      Coordination: Coordination normal.   Psychiatric:         Mood and Affect: Mood normal.         Thought Content: Thought content normal.         Judgment:  Judgment normal.       ???????????????????????????????????????????????????????????????  EKG:  EKG is performed at noon it is noted to be normal sinus rhythm with a ventricular rate of 66 bpm, parable of 172, QRS duration 90, QTc of 436 there is T wave inversion noted in lead I, aVL.  When compared to prior EKG on September 14, 2022 T wave inversions noted to be present in lead aVL, limitation interpretation of lead I.  There are no ST elevations, slight ST depression in lead II, this is previously demonstrated on prior EKG from September 14, 2022.  There is no significant axis deviation, overall interpretation is no new signs of ischemia or infarction when compared to prior EKG.    Assessment and Plan:  Patient is a 83-year-old female with past medical history as noted above who is presenting the emergency department after simple episode in nursing home.  Patient was being treated outpatient for urinary tract infection.  Patient noted to have new oxygen requirement, was concern the patient was hypotensive surrounding a syncopal episode.  Arrival emergency [place] patient is not hypotensive, she is on a nonrebreather however there is not a adequate waveform that is obtained with peripheral SPO2.  Forehead SPO2 is placed and the patient is satting 100% with a good waveform on 15 L nonrebreather.  Will attempt to wean oxygen at this time.  Patient eventually weaned to 2 L nasal cannula.  Patient will have broad work-up including cardiac work-up, VBG which showed slightly lower than excepted SaO2 for venous blood gas, no other significant derangements/acidosis/hypoglycemia.  Further work-up revealed that the patient had an elevated troponin.  Patient was given aspirin, repeat troponin was obtained.  Patient case was discussed with admissions coordinator recommended admission for continued syncope work-up/elevated troponin with further trending, and eventual disposition.  Blood cultures were also obtained for this  patient and the patient was started on broad-spectrum antibiotics to evaluate/treat for potential sepsis in the setting of known urinary tract infection.  I discussed patient case and patient care with family member who is at bedside, numbers agreeable to this plan agreeable to admission.  Patient was admitted to the hospital in stable condition for further work-up of syncope/further treatment of potential septic sources of cause the patient's syncope.    ED Course:  Diagnoses as of 11/16/23 1919   Syncope, unspecified syncope type   Urinary tract infection without hematuria, site unspecified        Social Determinants Limiting Care:      Disposition:  Admit    Gwyn Castanon MD       Procedures ? SmartLinks last updated 11/16/2023 7:19 PM        Gwyn Castanon MD  Resident  11/16/23 1923

## 2023-11-16 NOTE — SIGNIFICANT EVENT
Senior Staffing Note    Please see intern note for detailed H&P    HPI:  Kate Shah is a 83 y.o. female PMHx HTN, COPD, severe dementia, TIA who presents for loss of consciousness.     Patient was previously residing at [facility] but was discharged home on 11/8. She attends an adult day program at [facility] which is where she was this morning. Collateral obtained from NP at [facility]: pt was participating in a group activity when she was noted to be less responsive in her wheelchair. Her head was down and she was not responding to verbal stimuli. She was brought over to their clinic and vitals at the time: HR 48, BP 71/46, difficulty obtaining pulse ox and manual BP. No seizure like activity or urinary incontinence was noted. She never fell or injured herself. No EKG was performed. EMS was called.    Per ED resident, pt had cold extremities and new pulse ox in the ED was satting high 90s and pt quickly weaned from NRB to 2L NC.     Talking to the patient, she reports she can only remember waking up on the floor. She says she was found by her neighbors. She denies injuring herself or having any pain afterwards. She says she has been living at home by herself and is independent in ADL/IADLs including driving. She reports a similar episode where she lost consciousness happened 2 weeks ago. She denies recalling any preceding symptoms.  She later endorsed taking drugs such as cocaine but this would appear to conflict with other collateral.     Additional hx obtained from brother (Cecil): Pt lives at home with Cecil and his wife. She attends a day program at [facility] which is where the episode of LOC occurred today. Cecil says she was in her usual state of health and then received the covid shot yesterday and was not feeling well and did not eat much.     Per [facility]: her last MOCA was 11 in September. Her family makes decisions for her.     ED Interventions:   -vanc, cefepime, flagyl  -aspirin 324  -LR 1L  "    ROS:   - CONSTITUTIONAL: Denies fever and chills.  - HEENT: Denies changes in vision and hearing.  - RESPIRATORY: Denies SOB and cough.  - CV: Denies palpitations and chest pain.  - GI: Denies abdominal pain, nausea, vomiting, diarrhea, melena, or hematochezia.  - : Denies dysuria. +polyuria x 2 weeks   - MSK: Denies myalgia and joint pain.  - SKIN: Denies rash and pruritus.  - NEUROLOGICAL: Denies headache     Med rec per family:   Vitamin d  Cranberry  Vitamin C    Social hx:   Pt reports occasional alcohol  She states she smokes 1 PPD for 10 years  When asked about drug use (cocaine, marijuana, heroin, etc) she reports \"all of them \"    OBJECTIVE  /69   Pulse 79   Temp 36.5 °C (97.7 °F) (Tympanic)   Resp 17   Ht 1.6 m (5' 3\")   Wt (!) 40.8 kg (90 lb)   SpO2 97%   BMI 15.94 kg/m²      Physical exam:   Gen: elderly female, alert, conversant, in no apparent distress  HEENT: normocephalic, atraumatic, moist mucous membranes  CV: difficult to appreciate heart sounds. Regular pulse   Pulm: CTAB, no crackles or wheezes, no increased work of breathing  Abd: soft, non tender, non distended  : no lugo  Ext: warm, mild trace edema in the ankles/feet   Skin: dry skin and venous stasis changes present in the left leg   Neuro: moving all extremities, CN II-XII intact. Strength equal throughout   Psych: normal affect     Labs  Results for orders placed or performed during the hospital encounter of 11/16/23 (from the past 24 hour(s))   Blood Gas Venous Full Panel Unsolicited   Result Value Ref Range    POCT pH, Venous 7.35 7.33 - 7.43 pH    POCT pCO2, Venous 56 (H) 41 - 51 mm Hg    POCT pO2, Venous 25 (L) 35 - 45 mm Hg    POCT SO2, Venous 28 (L) 45 - 75 %    POCT Oxy Hemoglobin, Venous 27.7 (L) 45.0 - 75.0 %    POCT Hematocrit Calculated, Venous 45.0 36.0 - 46.0 %    POCT Sodium, Venous 140 136 - 145 mmol/L    POCT Potassium, Venous 3.6 3.5 - 5.3 mmol/L    POCT Chloride, Venous 106 98 - 107 mmol/L    POCT " Ionized Calicum, Venous 1.30 1.10 - 1.33 mmol/L    POCT Glucose, Venous 96 74 - 99 mg/dL    POCT Lactate, Venous 2.0 0.4 - 2.0 mmol/L    POCT Base Excess, Venous 3.7 (H) -2.0 - 3.0 mmol/L    POCT HCO3 Calculated, Venous 30.9 (H) 22.0 - 26.0 mmol/L    POCT Hemoglobin, Venous 15.1 12.0 - 16.0 g/dL    POCT Anion Gap, Venous 7.0 (L) 10.0 - 25.0 mmol/L    Patient Temperature 37.0 degrees Celsius   CBC and Auto Differential   Result Value Ref Range    WBC 6.8 4.4 - 11.3 x10*3/uL    nRBC 0.0 0.0 - 0.0 /100 WBCs    RBC 4.48 4.00 - 5.20 x10*6/uL    Hemoglobin 14.1 12.0 - 16.0 g/dL    Hematocrit 43.6 36.0 - 46.0 %    MCV 97 80 - 100 fL    MCH 31.5 26.0 - 34.0 pg    MCHC 32.3 32.0 - 36.0 g/dL    RDW 13.8 11.5 - 14.5 %    Platelets 125 (L) 150 - 450 x10*3/uL    Neutrophils % 54.9 40.0 - 80.0 %    Immature Granulocytes %, Automated 0.1 0.0 - 0.9 %    Lymphocytes % 17.9 13.0 - 44.0 %    Monocytes % 20.1 2.0 - 10.0 %    Eosinophils % 6.3 0.0 - 6.0 %    Basophils % 0.7 0.0 - 2.0 %    Neutrophils Absolute 3.74 1.60 - 5.50 x10*3/uL    Immature Granulocytes Absolute, Automated 0.01 0.00 - 0.50 x10*3/uL    Lymphocytes Absolute 1.22 0.80 - 3.00 x10*3/uL    Monocytes Absolute 1.37 (H) 0.05 - 0.80 x10*3/uL    Eosinophils Absolute 0.43 (H) 0.00 - 0.40 x10*3/uL    Basophils Absolute 0.05 0.00 - 0.10 x10*3/uL   Comprehensive Metabolic Panel   Result Value Ref Range    Glucose 89 74 - 99 mg/dL    Sodium 143 136 - 145 mmol/L    Potassium 3.8 3.5 - 5.3 mmol/L    Chloride 106 98 - 107 mmol/L    Bicarbonate 27 21 - 32 mmol/L    Anion Gap 14 10 - 20 mmol/L    Urea Nitrogen 20 6 - 23 mg/dL    Creatinine 0.92 0.50 - 1.05 mg/dL    eGFR 63 >60 mL/min/1.73m*2    Calcium 9.9 8.6 - 10.6 mg/dL    Albumin 3.7 3.4 - 5.0 g/dL    Alkaline Phosphatase 61 33 - 136 U/L    Total Protein 7.2 6.4 - 8.2 g/dL    AST 26 9 - 39 U/L    Bilirubin, Total 0.4 0.0 - 1.2 mg/dL    ALT 12 7 - 45 U/L   Lactate   Result Value Ref Range    Lactate 2.2 (H) 0.4 - 2.0 mmol/L    Troponin I, High Sensitivity   Result Value Ref Range    Troponin I, High Sensitivity 37 (H) 0 - 34 ng/L   Light Blue Top   Result Value Ref Range    Extra Tube Hold for add-ons.        Imaging  No results found.     ASSESSMENT/PLAN:  Kate Shah is a 83 y.o. female PMHx HTN, COPD, severe dementia, TIA who presents for loss of consciousness. Reportedly initial vitals at her facility were notable for bradycardia HR 50s and hypotension 71/46 with history of poor PO intake in the last day c/f hypovolemia. Ddx: also arrhythmia, tia. No seizure like activity reported. Labs notable for elevated trop likely representing demand ischemia in the absence of ACS symptoms and EKG without acute ischemic changes.     I have low concern for sepsis. Pt without white count, no localizing signs/symptoms although pt is poor historian. UA appears clean. Will obtain CXR. Will observe off abx and follow cultures.     #LOC   -1L fluids given  -obtain orthostatic vitals  -tele  -EKG with sinus rhythm   -TTE to eval for structural causes of syncope     #elevated troponin  -suspect demand ischemia. No symptoms suggesting ACS   -EKG without acute changes   -Troponin 37 -> 45, continue to trend until peak  -s/p aspirin load in the ED     Diet: soft/bite sized diet    Access: PIV  DVT ppx: lovenox  PT/OT eval    Pt to be formally staffed in the AM.     Rachana Camarena MD  IM PGY-3

## 2023-11-17 ENCOUNTER — APPOINTMENT (OUTPATIENT)
Dept: CARDIOLOGY | Facility: HOSPITAL | Age: 83
DRG: 315 | End: 2023-11-17
Payer: MEDICARE

## 2023-11-17 LAB
AMPHETAMINES UR QL SCN: NORMAL
ANION GAP SERPL CALC-SCNC: 9 MMOL/L (ref 10–20)
AORTIC VALVE PEAK VELOCITY: 0.8
APPEARANCE UR: ABNORMAL
AV PEAK GRADIENT: 2.6
AVA (PEAK VEL): 2.06
BARBITURATES UR QL SCN: NORMAL
BASOPHILS # BLD AUTO: 0.03 X10*3/UL (ref 0–0.1)
BASOPHILS NFR BLD AUTO: 0.5 %
BENZODIAZ UR QL SCN: NORMAL
BILIRUB UR STRIP.AUTO-MCNC: NEGATIVE MG/DL
BUN SERPL-MCNC: 16 MG/DL (ref 6–23)
BZE UR QL SCN: NORMAL
CALCIUM SERPL-MCNC: 8.9 MG/DL (ref 8.6–10.6)
CANNABINOIDS UR QL SCN: NORMAL
CHLORIDE SERPL-SCNC: 111 MMOL/L (ref 98–107)
CK SERPL-CCNC: 84 U/L (ref 0–215)
CO2 SERPL-SCNC: 26 MMOL/L (ref 21–32)
COLOR UR: YELLOW
CREAT SERPL-MCNC: 0.78 MG/DL (ref 0.5–1.05)
EJECTION FRACTION APICAL 4 CHAMBER: 59.7
EJECTION FRACTION: 62
EOSINOPHIL # BLD AUTO: 0.55 X10*3/UL (ref 0–0.4)
EOSINOPHIL NFR BLD AUTO: 10 %
ERYTHROCYTE [DISTWIDTH] IN BLOOD BY AUTOMATED COUNT: 13.7 % (ref 11.5–14.5)
FENTANYL+NORFENTANYL UR QL SCN: NORMAL
GFR SERPL CREATININE-BSD FRML MDRD: 76 ML/MIN/1.73M*2
GLUCOSE SERPL-MCNC: 67 MG/DL (ref 74–99)
GLUCOSE UR STRIP.AUTO-MCNC: NEGATIVE MG/DL
HCT VFR BLD AUTO: 38.7 % (ref 36–46)
HGB BLD-MCNC: 12.4 G/DL (ref 12–16)
HOLD SPECIMEN: NORMAL
HOLD SPECIMEN: NORMAL
IMM GRANULOCYTES # BLD AUTO: 0.01 X10*3/UL (ref 0–0.5)
IMM GRANULOCYTES NFR BLD AUTO: 0.2 % (ref 0–0.9)
KETONES UR STRIP.AUTO-MCNC: NEGATIVE MG/DL
LEFT ATRIUM VOLUME AREA LENGTH INDEX BSA: 20.6
LEFT VENTRICLE INTERNAL DIMENSION DIASTOLE: 3.86 (ref 3.5–6)
LEFT VENTRICULAR OUTFLOW TRACT DIAMETER: 1.76
LEUKOCYTE ESTERASE UR QL STRIP.AUTO: NEGATIVE
LYMPHOCYTES # BLD AUTO: 0.99 X10*3/UL (ref 0.8–3)
LYMPHOCYTES NFR BLD AUTO: 18 %
MAGNESIUM SERPL-MCNC: 1.84 MG/DL (ref 1.6–2.4)
MCH RBC QN AUTO: 31.1 PG (ref 26–34)
MCHC RBC AUTO-ENTMCNC: 32 G/DL (ref 32–36)
MCV RBC AUTO: 97 FL (ref 80–100)
MITRAL VALVE E/A RATIO: 0.58
MITRAL VALVE E/E' RATIO: 15.94
MONOCYTES # BLD AUTO: 0.88 X10*3/UL (ref 0.05–0.8)
MONOCYTES NFR BLD AUTO: 16 %
NEUTROPHILS # BLD AUTO: 3.04 X10*3/UL (ref 1.6–5.5)
NEUTROPHILS NFR BLD AUTO: 55.3 %
NITRITE UR QL STRIP.AUTO: NEGATIVE
NRBC BLD-RTO: 0 /100 WBCS (ref 0–0)
OPIATES UR QL SCN: NORMAL
OXYCODONE+OXYMORPHONE UR QL SCN: NORMAL
PCP UR QL SCN: NORMAL
PH UR STRIP.AUTO: 5 [PH]
PHOSPHATE SERPL-MCNC: 2.1 MG/DL (ref 2.5–4.9)
PLATELET # BLD AUTO: 118 X10*3/UL (ref 150–450)
POTASSIUM SERPL-SCNC: 3.6 MMOL/L (ref 3.5–5.3)
PROT UR STRIP.AUTO-MCNC: NEGATIVE MG/DL
RBC # BLD AUTO: 3.99 X10*6/UL (ref 4–5.2)
RBC # UR STRIP.AUTO: NEGATIVE /UL
RIGHT VENTRICLE FREE WALL PEAK S': 8.47
SODIUM SERPL-SCNC: 142 MMOL/L (ref 136–145)
SP GR UR STRIP.AUTO: 1.01
TRICUSPID ANNULAR PLANE SYSTOLIC EXCURSION: 1.2
UROBILINOGEN UR STRIP.AUTO-MCNC: <2 MG/DL
WBC # BLD AUTO: 5.5 X10*3/UL (ref 4.4–11.3)

## 2023-11-17 PROCEDURE — 2500000001 HC RX 250 WO HCPCS SELF ADMINISTERED DRUGS (ALT 637 FOR MEDICARE OP): Mod: SE

## 2023-11-17 PROCEDURE — 93356 MYOCRD STRAIN IMG SPCKL TRCK: CPT | Performed by: INTERNAL MEDICINE

## 2023-11-17 PROCEDURE — G0378 HOSPITAL OBSERVATION PER HR: HCPCS

## 2023-11-17 PROCEDURE — 80048 BASIC METABOLIC PNL TOTAL CA: CPT

## 2023-11-17 PROCEDURE — 36415 COLL VENOUS BLD VENIPUNCTURE: CPT

## 2023-11-17 PROCEDURE — 84100 ASSAY OF PHOSPHORUS: CPT

## 2023-11-17 PROCEDURE — 80307 DRUG TEST PRSMV CHEM ANLYZR: CPT

## 2023-11-17 PROCEDURE — 1200000002 HC GENERAL ROOM WITH TELEMETRY DAILY

## 2023-11-17 PROCEDURE — 81003 URINALYSIS AUTO W/O SCOPE: CPT | Performed by: STUDENT IN AN ORGANIZED HEALTH CARE EDUCATION/TRAINING PROGRAM

## 2023-11-17 PROCEDURE — 85025 COMPLETE CBC W/AUTO DIFF WBC: CPT

## 2023-11-17 PROCEDURE — 93306 TTE W/DOPPLER COMPLETE: CPT | Performed by: INTERNAL MEDICINE

## 2023-11-17 PROCEDURE — 2500000004 HC RX 250 GENERAL PHARMACY W/ HCPCS (ALT 636 FOR OP/ED): Mod: SE

## 2023-11-17 PROCEDURE — 83735 ASSAY OF MAGNESIUM: CPT

## 2023-11-17 PROCEDURE — 93306 TTE W/DOPPLER COMPLETE: CPT

## 2023-11-17 PROCEDURE — 93270 REMOTE 30 DAY ECG REV/REPORT: CPT

## 2023-11-17 RX ADMIN — HEPARIN SODIUM 5000 UNITS: 5000 INJECTION INTRAVENOUS; SUBCUTANEOUS at 02:46

## 2023-11-17 RX ADMIN — ASPIRIN 81 MG CHEWABLE TABLET 81 MG: 81 TABLET CHEWABLE at 08:44

## 2023-11-17 RX ADMIN — OXYCODONE HYDROCHLORIDE AND ACETAMINOPHEN 500 MG: 500 TABLET ORAL at 08:44

## 2023-11-17 RX ADMIN — PANTOPRAZOLE SODIUM 40 MG: 40 TABLET, DELAYED RELEASE ORAL at 08:44

## 2023-11-17 RX ADMIN — PERFLUTREN 2 ML OF DILUTION: 6.52 INJECTION, SUSPENSION INTRAVENOUS at 11:32

## 2023-11-17 RX ADMIN — Medication 2000 UNITS: at 08:44

## 2023-11-17 SDOH — SOCIAL STABILITY: SOCIAL INSECURITY: ARE THERE ANY APPARENT SIGNS OF INJURIES/BEHAVIORS THAT COULD BE RELATED TO ABUSE/NEGLECT?: UNABLE TO ASSESS

## 2023-11-17 SDOH — SOCIAL STABILITY: SOCIAL INSECURITY: HAS ANYONE EVER THREATENED TO HURT YOUR FAMILY OR YOUR PETS?: UNABLE TO ASSESS

## 2023-11-17 SDOH — SOCIAL STABILITY: SOCIAL INSECURITY: HAVE YOU HAD THOUGHTS OF HARMING ANYONE ELSE?: NO

## 2023-11-17 SDOH — SOCIAL STABILITY: SOCIAL INSECURITY: ARE YOU OR HAVE YOU BEEN THREATENED OR ABUSED PHYSICALLY, EMOTIONALLY, OR SEXUALLY BY ANYONE?: UNABLE TO ASSESS

## 2023-11-17 SDOH — SOCIAL STABILITY: SOCIAL INSECURITY: DOES ANYONE TRY TO KEEP YOU FROM HAVING/CONTACTING OTHER FRIENDS OR DOING THINGS OUTSIDE YOUR HOME?: UNABLE TO ASSESS

## 2023-11-17 SDOH — SOCIAL STABILITY: SOCIAL INSECURITY: WERE YOU ABLE TO COMPLETE ALL THE BEHAVIORAL HEALTH SCREENINGS?: NO

## 2023-11-17 SDOH — SOCIAL STABILITY: SOCIAL INSECURITY: DO YOU FEEL ANYONE HAS EXPLOITED OR TAKEN ADVANTAGE OF YOU FINANCIALLY OR OF YOUR PERSONAL PROPERTY?: UNABLE TO ASSESS

## 2023-11-17 SDOH — SOCIAL STABILITY: SOCIAL INSECURITY: DO YOU FEEL UNSAFE GOING BACK TO THE PLACE WHERE YOU ARE LIVING?: UNABLE TO ASSESS

## 2023-11-17 ASSESSMENT — ACTIVITIES OF DAILY LIVING (ADL)
HEARING - RIGHT EAR: FUNCTIONAL
PATIENT'S MEMORY ADEQUATE TO SAFELY COMPLETE DAILY ACTIVITIES?: NO
GROOMING: DEPENDENT
DRESSING YOURSELF: DEPENDENT
JUDGMENT_ADEQUATE_SAFELY_COMPLETE_DAILY_ACTIVITIES: YES
FEEDING YOURSELF: NEEDS ASSISTANCE
WALKS IN HOME: DEPENDENT
HEARING - LEFT EAR: FUNCTIONAL
BATHING: DEPENDENT
LACK_OF_TRANSPORTATION: PATIENT DECLINED
ADEQUATE_TO_COMPLETE_ADL: YES
TOILETING: DEPENDENT

## 2023-11-17 ASSESSMENT — PATIENT HEALTH QUESTIONNAIRE - PHQ9
SUM OF ALL RESPONSES TO PHQ9 QUESTIONS 1 & 2: 0
1. LITTLE INTEREST OR PLEASURE IN DOING THINGS: NOT AT ALL
2. FEELING DOWN, DEPRESSED OR HOPELESS: NOT AT ALL

## 2023-11-17 ASSESSMENT — COGNITIVE AND FUNCTIONAL STATUS - GENERAL
MOVING TO AND FROM BED TO CHAIR: A LOT
PERSONAL GROOMING: A LOT
CLIMB 3 TO 5 STEPS WITH RAILING: A LOT
MOBILITY SCORE: 12
PATIENT BASELINE BEDBOUND: NO
DRESSING REGULAR UPPER BODY CLOTHING: A LOT
DAILY ACTIVITIY SCORE: 11
TOILETING: TOTAL
HELP NEEDED FOR BATHING: A LOT
TURNING FROM BACK TO SIDE WHILE IN FLAT BAD: A LOT
DRESSING REGULAR LOWER BODY CLOTHING: A LOT
EATING MEALS: A LOT
WALKING IN HOSPITAL ROOM: A LOT
STANDING UP FROM CHAIR USING ARMS: A LOT
MOVING FROM LYING ON BACK TO SITTING ON SIDE OF FLAT BED WITH BEDRAILS: A LOT

## 2023-11-17 ASSESSMENT — PAIN SCALES - GENERAL
PAINLEVEL_OUTOF10: 0 - NO PAIN
PAINLEVEL_OUTOF10: 0 - NO PAIN

## 2023-11-17 ASSESSMENT — LIFESTYLE VARIABLES
HOW OFTEN DO YOU HAVE 6 OR MORE DRINKS ON ONE OCCASION: PATIENT DECLINED
HOW MANY STANDARD DRINKS CONTAINING ALCOHOL DO YOU HAVE ON A TYPICAL DAY: PATIENT DECLINED
AUDIT-C TOTAL SCORE: -1
SKIP TO QUESTIONS 9-10: 0
HOW OFTEN DO YOU HAVE A DRINK CONTAINING ALCOHOL: PATIENT DECLINED
AUDIT-C TOTAL SCORE: -1

## 2023-11-17 ASSESSMENT — PAIN - FUNCTIONAL ASSESSMENT
PAIN_FUNCTIONAL_ASSESSMENT: 0-10
PAIN_FUNCTIONAL_ASSESSMENT: 0-10

## 2023-11-17 NOTE — DISCHARGE SUMMARY
Discharge Diagnosis  Syncope, unspecified syncope type    Issues Requiring Follow-Up  [ ] F/U on Holter monitor results  [ ] Ensure patient has appropriate medications and follow up    Test Results Pending At Discharge  Pending Labs       Order Current Status    Drug Screen, Urine Collected (11/16/23 1650)    Blood Culture Preliminary result    Blood Culture Preliminary result    Extra Tubes Preliminary result    Light Blue Top Preliminary result            Hospital Course  Kate Shah is an 83-year-old female past medical history of hypertension COPD severe dementia TIA who presented to us after an episode of loss of consciousness.  Post syncopal vital signs were notable for hypotension with BP at 71/48 and bradycardia in the 40s as well as hypoxemia.  History is notable for recent vaccine and poor oral intake.  Labs are notable for elevated trops likely type II MI in the setting of syncope and initial elevation in lactate that has normalized.  Differential diagnosis include hypovolemia, arrhythmia, infectious process. Most likely vagally mediated with inappropriate response. TTE showed EF of 60% with evidence of impaired relaxation of left ventricle. Orthostatics were normal. A Holter monitor was placed and instructions to follow up with PCP after 2 weeks were made.  Patient was discharged with appropriate follow up.      Pertinent Physical Exam At Time of Discharge  Physical Exam  Physical Exam   Constitutional: Well-developed female in no acute distress.  HEENT: Normocephalic, atraumatic. PERRL. EOMI. No cervical lymphadenopathy.  Respiratory: CTA bilaterally. No wheezes, rales, or rhonchi. Normal respiratory effort.  Cardiovascular: RRR. No murmurs, gallops, or rubs. No JVD. Radial pulses 2+.  Abdominal: Soft, nondistended, nontender to palpation. Bowel sounds present. No hepatosplenomegaly or masses. No CVA tenderness.  Neuro: CN II-XII intact.   MSK: No LE edema bilaterally.  Skin: Warm, dry. No rashes or  wounds.  Psych: Appropriate mood and affect.  Home Medications     Medication List      CONTINUE taking these medications     ascorbic acid 500 mg tablet; Commonly known as: Vitamin C   aspirin 81 mg chewable tablet   Boost 0.04 gram- 1 kcal/mL liquid; Generic drug: food supplemt,   lactose-reduced   cholecalciferol 50 mcg (2,000 unit) capsule; Commonly known as: Vitamin   D-3   cranberry 500 mg capsule   docusate sodium 100 mg capsule; Commonly known as: Colace   lactobacillus acidophilus & bulgar 1 million cell chewable tablet;   Commonly known as: Lactinex   rosuvastatin 20 mg tablet; Commonly known as: Crestor     STOP taking these medications     cetirizine 10 mg chewable tablet; Commonly known as: ZyrTEC   polyethylene glycol 17 gram/dose powder; Commonly known as: Glycolax,   Miralax   pramoxine-menthol 1-1 % cream       Outpatient Follow-Up  Future Appointments   Date Time Provider Department Center   11/28/2023 10:00 AM MIN VASC ROOM [id number]   12/6/2023  1:00 PM Sherrell Licona MD [id number]       Elroy Chavez MD

## 2023-11-17 NOTE — PROGRESS NOTES
Kate Shah is a 83 y.o. female on day 1 of admission presenting with Syncope, unspecified syncope type.      Assessment/Plan   4:30pm MD asking for discharge transport. MAURICE attempted to arrange on roundtrip, however, pt's insurance didn't pull through. MARUICE called [facility] and they accepted request for an 11 pm . MAURICE called Pt's relative Noreen. She stated her son was to  Pt. MAURICE confirmed he was reportedly unable to. She agreed to call back.  MAURICE gave report to RN for disposition if transport needed to be canceled.   MAURICE called Noreen and she stated she gave Juan David MAURICE's number. MAURICE stated she could no longer monitor if changes are made. Son is Juan David Coronado [phone].  MAURICE signed out to ISABEL Holcomb.    PRECIOUS Lewis

## 2023-11-17 NOTE — PROGRESS NOTES
Pharmacy Medication History Review    Kate Shah is a 83 y.o. female admitted for Syncope, unspecified syncope type. Pharmacy reviewed the patient's [id number] medications and allergies for accuracy.    The list below reflectives the updated PTA list. Please review each medication in order reconciliation for additional clarification and justification.    Prior to Admission Medications   Prescriptions Last Dose Informant Patient Reported? Taking?   ascorbic acid (Vitamin C) 500 mg tablet    Yes   Sig: Take 1 tablet (500 mg) by mouth once daily.   aspirin 81 mg chewable tablet    Yes   Sig: Chew 1 tablet (81 mg) once daily.   cetirizine (ZyrTEC) 10 mg chewable tablet    Yes   Sig: Chew 1 tablet (10 mg) once daily.   cholecalciferol (Vitamin D-3) 50 mcg (2,000 unit) capsule    Yes   Sig: Take 1 capsule (50 mcg) by mouth early in the morning..   cranberry 500 mg capsule    Yes   Sig: Take 500 mg by mouth once daily.   docusate sodium (Colace) 100 mg capsule    Yes   Sig: Take 1 capsule (100 mg) by mouth once daily as needed for constipation.   food supplemt, lactose-reduced (Boost) 0.04 gram- 1 kcal/mL liquid    Yes   Si SUPPLEMENT DAILY BETWEEN MEAL   lactobacillus acidophilus & bulgar (Lactinex) 1 million cell chewable tablet    Yes   Sig: Chew 1 tablet once daily.   polyethylene glycol (Glycolax, Miralax) 17 gram/dose powder    Yes   Sig: Take 17 g by mouth once daily as needed (constipation).   pramoxine-menthol 1-1 % cream    Yes   Sig: Apply 1 Application topically twice a day.   rosuvastatin (Crestor) 20 mg tablet    Yes   Sig: Take 1 tablet (20 mg) by mouth once daily.      Facility-Administered Medications: None          The list below reflectives the updated allergy list. Please review each documented allergy for additional clarification and justification.  Allergies  Reviewed by Mishel Morrissey RN on 2023        Severity Reactions Comments    Penicillins Low Rash, Unknown            Sources used: Pharmacy dispense history, OARRs, and pt participates in the [facility] Medication Adherence program (list provided with patient)    Below are additional concerns with the patient's PTA list.  none    Tere Castro, PharmD  Transitions of Care Pharmacist  Medication reconciliation complete  Please reach out via [facility] secure chat for questions, or if no response call [facility] or [employer]  [facility]

## 2023-11-17 NOTE — HOSPITAL COURSE
Kate Shah is an 83-year-old female past medical history of hypertension COPD, severe dementia, TIA who presented to us after an episode of loss of consciousness. Pt was reported to be unresponsive while at her adult day program, vitals at the time reportedly bradycardia HR 40s, BP 71/48, and reportedly hypoxic. Upon re-assessment in the ED, she was noted to have a poor pulse ox wave form due to cold extremities and repeat evaluation was found to be satting high 90s on RA. In the ED, she quickly recovered to her baseline after fluids. History per family is notable for receiving her covid vaccine the day prior and poor PO intake that day. Labs are notable for elevated trops likely type II MI in the setting of syncope and initial elevation in lactate that has normalized.  Differential diagnosis include hypovolemia, vasovagal syncope, arrhythmia. Most likely vagally mediated with inappropriate response. TTE showed EF of 60% with evidence of impaired relaxation of left ventricle. Repeat orthostatics after IVF were normal. Pt was discharged home on 11/18 with a ziopatch and instructions to follow up with PCP after 2 weeks were made.

## 2023-11-17 NOTE — DISCHARGE INSTRUCTIONS
Dear Ms. Pablo,  You were admitted to us after you were found to be unresponsive by the staff at the [facility].  We have run some tests and found that your labs look good and we did look at your heart using an ultrasound we found that it had good function.  Continuous monitoring of your heart did not reveal any obvious arrhythmia or problem with the conduction.  Things to follow-up:  take medications as outlined in this document.  2.  Follow-up outpatient appointments as scheduled in this document.  3.  Please avoid.'s of dehydration.  4. Follow up with PCP in weeks to review results of holter monitor.  It was a pleasure being part of your care,    [facility] Internal medicine team

## 2023-11-17 NOTE — PROGRESS NOTES
"Kate Shah is a 83 y.o. female on day 1 of admission presenting with Syncope, unspecified syncope type.    Subjective   No acute events overnight. Patient denied, nausea, vomiting, fever, and cough.  Reports feeling better. Telemetry showed Hrs of 60s.         Objective     Physical Exam   Constitutional: Well-developed female in no acute distress.  HEENT: Normocephalic, atraumatic. PERRL. EOMI. No cervical lymphadenopathy.  Respiratory: CTA bilaterally. No wheezes, rales, or rhonchi. Normal respiratory effort.  Cardiovascular: RRR. No murmurs, gallops, or rubs. No JVD. Radial pulses 2+.  Abdominal: Soft, nondistended, nontender to palpation. Bowel sounds present. No hepatosplenomegaly or masses. No CVA tenderness.  Neuro: CN II-XII intact.   MSK: No LE edema bilaterally.  Skin: Warm, dry. No rashes or wounds.  Psych: Appropriate mood and affect.  Last Recorded Vitals  Blood pressure 108/62, pulse 82, temperature 36.5 °C (97.7 °F), temperature source Tympanic, resp. rate 16, height 1.6 m (5' 3\"), weight (!) 40.8 kg (90 lb), SpO2 98 %.  Intake/Output last 3 Shifts:  I/O last 3 completed shifts:  In: 1400 (34.3 mL/kg) [IV Piggyback:1400]  Out: - (0 mL/kg)   Weight: 40.8 kg     Relevant Results            Active Medications  Scheduled medications  ascorbic acid, 500 mg, oral, Daily  aspirin, 81 mg, oral, Daily  cholecalciferol, 2,000 Units, oral, Daily  heparin (porcine), 5,000 Units, subcutaneous, q8h  pantoprazole, 40 mg, oral, Daily before breakfast   Or  pantoprazole, 40 mg, intravenous, Daily before breakfast  perflutren lipid microspheres, 0.5-10 mL of dilution, intravenous, Once in imaging  perflutren protein A microsphere, 0.5 mL, intravenous, Once in imaging  polyethylene glycol, 17 g, oral, Daily  rosuvastatin, 20 mg, oral, Nightly  sulfur hexafluoride microsphr, 2 mL, intravenous, Once in imaging      Continuous medications     PRN medications  PRN medications: acetaminophen **OR** acetaminophen **OR** " acetaminophen, albuterol, ammonium lactate, benzocaine-menthol, calcium carbonate, guaiFENesin     Recent Labs  Results for orders placed or performed during the hospital encounter of 11/16/23 (from the past 24 hour(s))   Blood Gas Venous Full Panel Unsolicited   Result Value Ref Range    POCT pH, Venous 7.35 7.33 - 7.43 pH    POCT pCO2, Venous 56 (H) 41 - 51 mm Hg    POCT pO2, Venous 25 (L) 35 - 45 mm Hg    POCT SO2, Venous 28 (L) 45 - 75 %    POCT Oxy Hemoglobin, Venous 27.7 (L) 45.0 - 75.0 %    POCT Hematocrit Calculated, Venous 45.0 36.0 - 46.0 %    POCT Sodium, Venous 140 136 - 145 mmol/L    POCT Potassium, Venous 3.6 3.5 - 5.3 mmol/L    POCT Chloride, Venous 106 98 - 107 mmol/L    POCT Ionized Calicum, Venous 1.30 1.10 - 1.33 mmol/L    POCT Glucose, Venous 96 74 - 99 mg/dL    POCT Lactate, Venous 2.0 0.4 - 2.0 mmol/L    POCT Base Excess, Venous 3.7 (H) -2.0 - 3.0 mmol/L    POCT HCO3 Calculated, Venous 30.9 (H) 22.0 - 26.0 mmol/L    POCT Hemoglobin, Venous 15.1 12.0 - 16.0 g/dL    POCT Anion Gap, Venous 7.0 (L) 10.0 - 25.0 mmol/L    Patient Temperature 37.0 degrees Celsius   CBC and Auto Differential   Result Value Ref Range    WBC 6.8 4.4 - 11.3 x10*3/uL    nRBC 0.0 0.0 - 0.0 /100 WBCs    RBC 4.48 4.00 - 5.20 x10*6/uL    Hemoglobin 14.1 12.0 - 16.0 g/dL    Hematocrit 43.6 36.0 - 46.0 %    MCV 97 80 - 100 fL    MCH 31.5 26.0 - 34.0 pg    MCHC 32.3 32.0 - 36.0 g/dL    RDW 13.8 11.5 - 14.5 %    Platelets 125 (L) 150 - 450 x10*3/uL    Neutrophils % 54.9 40.0 - 80.0 %    Immature Granulocytes %, Automated 0.1 0.0 - 0.9 %    Lymphocytes % 17.9 13.0 - 44.0 %    Monocytes % 20.1 2.0 - 10.0 %    Eosinophils % 6.3 0.0 - 6.0 %    Basophils % 0.7 0.0 - 2.0 %    Neutrophils Absolute 3.74 1.60 - 5.50 x10*3/uL    Immature Granulocytes Absolute, Automated 0.01 0.00 - 0.50 x10*3/uL    Lymphocytes Absolute 1.22 0.80 - 3.00 x10*3/uL    Monocytes Absolute 1.37 (H) 0.05 - 0.80 x10*3/uL    Eosinophils Absolute 0.43 (H) 0.00 - 0.40  x10*3/uL    Basophils Absolute 0.05 0.00 - 0.10 x10*3/uL   Comprehensive Metabolic Panel   Result Value Ref Range    Glucose 89 74 - 99 mg/dL    Sodium 143 136 - 145 mmol/L    Potassium 3.8 3.5 - 5.3 mmol/L    Chloride 106 98 - 107 mmol/L    Bicarbonate 27 21 - 32 mmol/L    Anion Gap 14 10 - 20 mmol/L    Urea Nitrogen 20 6 - 23 mg/dL    Creatinine 0.92 0.50 - 1.05 mg/dL    eGFR 63 >60 mL/min/1.73m*2    Calcium 9.9 8.6 - 10.6 mg/dL    Albumin 3.7 3.4 - 5.0 g/dL    Alkaline Phosphatase 61 33 - 136 U/L    Total Protein 7.2 6.4 - 8.2 g/dL    AST 26 9 - 39 U/L    Bilirubin, Total 0.4 0.0 - 1.2 mg/dL    ALT 12 7 - 45 U/L   Lactate   Result Value Ref Range    Lactate 2.2 (H) 0.4 - 2.0 mmol/L   Blood Culture    Specimen: Peripheral Venipuncture; Blood culture   Result Value Ref Range    Blood Culture Loaded on Instrument - Culture in progress    Blood Culture    Specimen: Peripheral Venipuncture; Blood culture   Result Value Ref Range    Blood Culture Loaded on Instrument - Culture in progress    Troponin I, High Sensitivity   Result Value Ref Range    Troponin I, High Sensitivity 37 (H) 0 - 34 ng/L   Light Blue Top   Result Value Ref Range    Extra Tube Hold for add-ons.    Urinalysis with Reflex Microscopic and Culture   Result Value Ref Range    Color, Urine Yellow Straw, Yellow    Appearance, Urine Hazy (N) Clear    Specific Gravity, Urine 1.021 1.005 - 1.035    pH, Urine 5.0 5.0, 5.5, 6.0, 6.5, 7.0, 7.5, 8.0    Protein, Urine 30 (1+) (N) NEGATIVE mg/dL    Glucose, Urine NEGATIVE NEGATIVE mg/dL    Blood, Urine NEGATIVE NEGATIVE    Ketones, Urine NEGATIVE NEGATIVE mg/dL    Bilirubin, Urine NEGATIVE NEGATIVE    Urobilinogen, Urine 2.0 (N) <2.0 mg/dL    Nitrite, Urine NEGATIVE NEGATIVE    Leukocyte Esterase, Urine NEGATIVE NEGATIVE   Extra Urine Gray Tube   Result Value Ref Range    Extra Tube Hold for add-ons.    Urinalysis Microscopic   Result Value Ref Range    WBC, Urine NONE 1-5, NONE /HPF    RBC, Urine 1-2 NONE, 1-2,  3-5 /HPF    Squamous Epithelial Cells, Urine 1-9 (SPARSE) Reference range not established. /HPF   Lactate   Result Value Ref Range    Lactate 1.1 0.4 - 2.0 mmol/L   Troponin I, High Sensitivity   Result Value Ref Range    Troponin I, High Sensitivity 45 (H) 0 - 34 ng/L   ECG 12 lead   Result Value Ref Range    Ventricular Rate 66 BPM    Atrial Rate 66 BPM    WA Interval 172 ms    QRS Duration 90 ms    QT Interval 416 ms    QTC Calculation(Bazett) 436 ms    P Axis 19 degrees    R Axis 35 degrees    T Axis 136 degrees    QRS Count 11 beats    Q Onset 223 ms    P Onset 137 ms    P Offset 194 ms    T Offset 431 ms    QTC Fredericia 429 ms   Troponin I, High Sensitivity   Result Value Ref Range    Troponin I, High Sensitivity 42 (H) 0 - 34 ng/L   Creatine Kinase   Result Value Ref Range    Creatine Kinase 84 0 - 215 U/L   CBC and Auto Differential   Result Value Ref Range    WBC 5.5 4.4 - 11.3 x10*3/uL    nRBC 0.0 0.0 - 0.0 /100 WBCs    RBC 3.99 (L) 4.00 - 5.20 x10*6/uL    Hemoglobin 12.4 12.0 - 16.0 g/dL    Hematocrit 38.7 36.0 - 46.0 %    MCV 97 80 - 100 fL    MCH 31.1 26.0 - 34.0 pg    MCHC 32.0 32.0 - 36.0 g/dL    RDW 13.7 11.5 - 14.5 %    Platelets 118 (L) 150 - 450 x10*3/uL    Neutrophils % 55.3 40.0 - 80.0 %    Immature Granulocytes %, Automated 0.2 0.0 - 0.9 %    Lymphocytes % 18.0 13.0 - 44.0 %    Monocytes % 16.0 2.0 - 10.0 %    Eosinophils % 10.0 0.0 - 6.0 %    Basophils % 0.5 0.0 - 2.0 %    Neutrophils Absolute 3.04 1.60 - 5.50 x10*3/uL    Immature Granulocytes Absolute, Automated 0.01 0.00 - 0.50 x10*3/uL    Lymphocytes Absolute 0.99 0.80 - 3.00 x10*3/uL    Monocytes Absolute 0.88 (H) 0.05 - 0.80 x10*3/uL    Eosinophils Absolute 0.55 (H) 0.00 - 0.40 x10*3/uL    Basophils Absolute 0.03 0.00 - 0.10 x10*3/uL   Basic Metabolic Panel   Result Value Ref Range    Glucose 67 (L) 74 - 99 mg/dL    Sodium 142 136 - 145 mmol/L    Potassium 3.6 3.5 - 5.3 mmol/L    Chloride 111 (H) 98 - 107 mmol/L    Bicarbonate 26 21 -  32 mmol/L    Anion Gap 9 (L) 10 - 20 mmol/L    Urea Nitrogen 16 6 - 23 mg/dL    Creatinine 0.78 0.50 - 1.05 mg/dL    eGFR 76 >60 mL/min/1.73m*2    Calcium 8.9 8.6 - 10.6 mg/dL   Magnesium   Result Value Ref Range    Magnesium 1.84 1.60 - 2.40 mg/dL   Phosphorus   Result Value Ref Range    Phosphorus 2.1 (L) 2.5 - 4.9 mg/dL   SST TOP   Result Value Ref Range    Extra Tube Hold for add-ons.        Imaging  XR chest 1 view    Result Date: 11/16/2023  Interpreted By:  Timothy Valentin  and Nikki Bryson STUDY: XR CHEST 1 VIEW;  11/16/2023 5:56 pm   INDICATION: Signs/Symptoms:elevated troponin, hx syncope.   COMPARISON: 09/14/2022 chest radiograph   ACCESSION NUMBER(S): [id number]   ORDERING CLINICIAN: KENY ALICEA   FINDINGS: AP radiograph of the chest was provided.     CARDIOMEDIASTINAL SILHOUETTE: Cardiomediastinal silhouette is normal in size and configuration. Aortic knob calcifications are noted.   LUNGS: Coarse interstitial lung markings and apical predominant lucencies. No focal consolidation, pleural effusion or pneumothorax.   ABDOMEN: No remarkable upper abdominal findings.   BONES: No acute osseous changes.       1.  No evidence of acute cardiopulmonary process. 2. Findings of chronic lung disease.   I personally reviewed the images/study and I agree with the resident Braydon Jordan's findings as stated. This study was interpreted at [place], [place].   MACRO: None   Signed by: Timothy Valentin 11/16/2023 6:37 PM Dictation workstation:   [id number]    ECG 12 lead    Result Date: 11/16/2023  Sinus rhythm with occasional Premature ventricular complexes Possible Anterior infarct , age undetermined ST & T wave abnormality, consider lateral ischemia Abnormal ECG When compared with ECG of 14-SEP-2022 12:23, Previous ECG has undetermined rhythm, needs review Please see ED Provider Note for formal interpretation Confirmed by Sade Mckenzie ([id number]) on 11/16/2023 4:21:34  PM                        Assessment/Plan   Principal Problem:    Syncope, unspecified syncope type  Active Problems:    Loss of consciousness (CMS/HCC)    Kate Shah is an 83-year-old female past medical history of hypertension COPD severe dementia TIA who presented to us after an episode of loss of consciousness.  Post syncopal vital signs were notable for hypotension with BP at 71/48 and bradycardia in the 40s as well as hypoxemia.  History is notable for recent vaccine and poor oral intake.  Labs are notable for elevated trops likely type II MI in the setting of syncope and initial elevation in lactate that has normalized.  Differential diagnosis include hypovolemia, arrhythmia, infectious process. Most likely vagally mediated with inappropriate response.     #Syncopal episode  :: EKG showing no sign of an ischemia  :: No leukocytosis or signs of infection noted on UA  :: No seizure-like activity reported  :: No concern for any recent activity  -Status post 1 L LR  -Orthostatics Pending  -We will admit to floor with telemetry  -TTE Pending  - Plan for Dc with holter monitor     #Elevated troponin ( resolved)  :: EKG normal  :: No chest pain history  :: Post syncopal episode  :: Likely type II MI  -Troponin 37-45-42 we will continue to trend until peak or plateau  -Status post aspirin load  - ASA 81     #History of TIA  -Continue home Rouvastatin     #History of COPD  -Albuterol as needed     #Dementia  -Patient not taking any home medications     Fluids: Status post 1 L LR   Diet: Soft bite-size  Access PIV  DVT prophylaxis Lovenox  PT OT eval ordered     Full code  NOK:  Elie Shah   [phone]           Elroy Chavez MD

## 2023-11-18 VITALS
SYSTOLIC BLOOD PRESSURE: 127 MMHG | RESPIRATION RATE: 20 BRPM | DIASTOLIC BLOOD PRESSURE: 68 MMHG | TEMPERATURE: 99.3 F | HEIGHT: 63 IN | BODY MASS INDEX: 23.67 KG/M2 | OXYGEN SATURATION: 96 % | HEART RATE: 75 BPM | WEIGHT: 133.6 LBS

## 2023-11-18 LAB
ANION GAP SERPL CALC-SCNC: 14 MMOL/L (ref 10–20)
BASOPHILS # BLD AUTO: 0.02 X10*3/UL (ref 0–0.1)
BASOPHILS NFR BLD AUTO: 0.4 %
BUN SERPL-MCNC: 11 MG/DL (ref 6–23)
CALCIUM SERPL-MCNC: 9.2 MG/DL (ref 8.6–10.6)
CHLORIDE SERPL-SCNC: 111 MMOL/L (ref 98–107)
CO2 SERPL-SCNC: 22 MMOL/L (ref 21–32)
CREAT SERPL-MCNC: 0.74 MG/DL (ref 0.5–1.05)
EOSINOPHIL # BLD AUTO: 0.35 X10*3/UL (ref 0–0.4)
EOSINOPHIL NFR BLD AUTO: 6.3 %
ERYTHROCYTE [DISTWIDTH] IN BLOOD BY AUTOMATED COUNT: 13.7 % (ref 11.5–14.5)
GFR SERPL CREATININE-BSD FRML MDRD: 81 ML/MIN/1.73M*2
GLUCOSE BLD MANUAL STRIP-MCNC: 100 MG/DL (ref 74–99)
GLUCOSE BLD MANUAL STRIP-MCNC: 115 MG/DL (ref 74–99)
GLUCOSE BLD MANUAL STRIP-MCNC: 81 MG/DL (ref 74–99)
GLUCOSE SERPL-MCNC: 70 MG/DL (ref 74–99)
HCT VFR BLD AUTO: 41 % (ref 36–46)
HGB BLD-MCNC: 13.2 G/DL (ref 12–16)
IMM GRANULOCYTES # BLD AUTO: 0.01 X10*3/UL (ref 0–0.5)
IMM GRANULOCYTES NFR BLD AUTO: 0.2 % (ref 0–0.9)
LYMPHOCYTES # BLD AUTO: 0.96 X10*3/UL (ref 0.8–3)
LYMPHOCYTES NFR BLD AUTO: 17.2 %
MAGNESIUM SERPL-MCNC: 2.05 MG/DL (ref 1.6–2.4)
MCH RBC QN AUTO: 31.2 PG (ref 26–34)
MCHC RBC AUTO-ENTMCNC: 32.2 G/DL (ref 32–36)
MCV RBC AUTO: 97 FL (ref 80–100)
MONOCYTES # BLD AUTO: 0.69 X10*3/UL (ref 0.05–0.8)
MONOCYTES NFR BLD AUTO: 12.4 %
NEUTROPHILS # BLD AUTO: 3.54 X10*3/UL (ref 1.6–5.5)
NEUTROPHILS NFR BLD AUTO: 63.5 %
NRBC BLD-RTO: 0 /100 WBCS (ref 0–0)
PHOSPHATE SERPL-MCNC: 2.1 MG/DL (ref 2.5–4.9)
PLATELET # BLD AUTO: 145 X10*3/UL (ref 150–450)
POTASSIUM SERPL-SCNC: 4 MMOL/L (ref 3.5–5.3)
RBC # BLD AUTO: 4.23 X10*6/UL (ref 4–5.2)
SODIUM SERPL-SCNC: 143 MMOL/L (ref 136–145)
WBC # BLD AUTO: 5.6 X10*3/UL (ref 4.4–11.3)

## 2023-11-18 PROCEDURE — 85025 COMPLETE CBC W/AUTO DIFF WBC: CPT

## 2023-11-18 PROCEDURE — 2500000004 HC RX 250 GENERAL PHARMACY W/ HCPCS (ALT 636 FOR OP/ED): Mod: SE

## 2023-11-18 PROCEDURE — 36415 COLL VENOUS BLD VENIPUNCTURE: CPT

## 2023-11-18 PROCEDURE — 96372 THER/PROPH/DIAG INJ SC/IM: CPT

## 2023-11-18 PROCEDURE — 83735 ASSAY OF MAGNESIUM: CPT

## 2023-11-18 PROCEDURE — 82947 ASSAY GLUCOSE BLOOD QUANT: CPT | Mod: 59

## 2023-11-18 PROCEDURE — 99238 HOSP IP/OBS DSCHRG MGMT 30/<: CPT | Performed by: STUDENT IN AN ORGANIZED HEALTH CARE EDUCATION/TRAINING PROGRAM

## 2023-11-18 PROCEDURE — 2500000001 HC RX 250 WO HCPCS SELF ADMINISTERED DRUGS (ALT 637 FOR MEDICARE OP): Mod: SE

## 2023-11-18 PROCEDURE — 84100 ASSAY OF PHOSPHORUS: CPT

## 2023-11-18 PROCEDURE — 80048 BASIC METABOLIC PNL TOTAL CA: CPT

## 2023-11-18 PROCEDURE — 82947 ASSAY GLUCOSE BLOOD QUANT: CPT | Mod: 91

## 2023-11-18 PROCEDURE — G0378 HOSPITAL OBSERVATION PER HR: HCPCS

## 2023-11-18 RX ORDER — ACETAMINOPHEN 500 MG
5 TABLET ORAL NIGHTLY
Status: DISCONTINUED | OUTPATIENT
Start: 2023-11-18 | End: 2023-11-18 | Stop reason: HOSPADM

## 2023-11-18 RX ADMIN — HEPARIN SODIUM 5000 UNITS: 5000 INJECTION INTRAVENOUS; SUBCUTANEOUS at 08:28

## 2023-11-18 RX ADMIN — ROSUVASTATIN CALCIUM 20 MG: 20 TABLET, FILM COATED ORAL at 00:43

## 2023-11-18 RX ADMIN — PANTOPRAZOLE SODIUM 40 MG: 40 TABLET, DELAYED RELEASE ORAL at 08:28

## 2023-11-18 RX ADMIN — OXYCODONE HYDROCHLORIDE AND ACETAMINOPHEN 500 MG: 500 TABLET ORAL at 08:28

## 2023-11-18 RX ADMIN — POLYETHYLENE GLYCOL 3350 17 G: 17 POWDER, FOR SOLUTION ORAL at 08:29

## 2023-11-18 RX ADMIN — HEPARIN SODIUM 5000 UNITS: 5000 INJECTION INTRAVENOUS; SUBCUTANEOUS at 00:45

## 2023-11-18 RX ADMIN — Medication 2000 UNITS: at 08:28

## 2023-11-18 RX ADMIN — ASPIRIN 81 MG CHEWABLE TABLET 81 MG: 81 TABLET CHEWABLE at 08:28

## 2023-11-18 ASSESSMENT — COGNITIVE AND FUNCTIONAL STATUS - GENERAL
TOILETING: A LOT
MOVING TO AND FROM BED TO CHAIR: A LOT
CLIMB 3 TO 5 STEPS WITH RAILING: A LOT
DAILY ACTIVITIY SCORE: 14
WALKING IN HOSPITAL ROOM: A LOT
MOBILITY SCORE: 14
DRESSING REGULAR LOWER BODY CLOTHING: A LOT
DRESSING REGULAR UPPER BODY CLOTHING: A LOT
PERSONAL GROOMING: A LOT
TURNING FROM BACK TO SIDE WHILE IN FLAT BAD: A LITTLE
HELP NEEDED FOR BATHING: A LOT
MOVING FROM LYING ON BACK TO SITTING ON SIDE OF FLAT BED WITH BEDRAILS: A LITTLE
STANDING UP FROM CHAIR USING ARMS: A LOT

## 2023-11-18 ASSESSMENT — PAIN SCALES - GENERAL
PAINLEVEL_OUTOF10: 0 - NO PAIN

## 2023-11-18 ASSESSMENT — PAIN - FUNCTIONAL ASSESSMENT
PAIN_FUNCTIONAL_ASSESSMENT: 0-10

## 2023-11-18 NOTE — NURSING NOTE
11/18/2023: Discharge Planning Note: 1205  Pt. IV access in right AC and left hand was removed and intact prior to discharge. The patient left with all her belongings (A patient belongings bag with her heart monitor box, she had on her long-sleeved shirt and maroon colored coat.) The patient was transported by [facility] home.   Lindsay Gomez LPN

## 2023-11-18 NOTE — PROGRESS NOTES
Per medical team pt is medically clear for discharge home, will need transport via stretcher due to dementia. Transport for 1145 confirmed via [facility] [phone] in [employer]. Medical team, bedside RN, and daughter Bernardo [phone] updated on transport time. Bernardo stated she is home to receive pt. Unit secretary provided with blue transfer slip.    Tommie Young RN  Transitional Care Coordinator/TCC  [id number]

## 2023-11-18 NOTE — CARE PLAN
The clinical goals for the shift include remain free from falls during shift      Problem: Skin  Goal: Decreased wound size/increased tissue granulation at next dressing change  Outcome: Progressing  Goal: Prevent/manage excess moisture  11/18/2023 0011 by Yvonne Stockton RN  Flowsheets (Taken 11/18/2023 0011)  Prevent/manage excess moisture: Cleanse incontinence/protect with barrier cream  11/17/2023 2242 by Yvonne Stockton RN  Outcome: Progressing  Goal: Prevent/minimize sheer/friction injuries  Outcome: Progressing

## 2023-11-18 NOTE — DISCHARGE SUMMARY
Discharge Diagnosis  Syncope, unspecified syncope type    Issues Requiring Follow-Up  [ ] F/U on ziopatch results  [ ] follow up with pcp     Test Results Pending At Discharge  Pending Labs       Order Current Status    Drug Screen, Urine Collected (11/16/23 1650)    Blood Culture Preliminary result    Blood Culture Preliminary result    Extra Tubes Preliminary result    Light Blue Top Preliminary result            Hospital Course  Kate Shah is an 83-year-old female past medical history of hypertension COPD, severe dementia, TIA who presented to us after an episode of loss of consciousness. Pt was reported to be unresponsive while at her adult day program, vitals at the time reportedly bradycardia HR 40s, BP 71/48, and reportedly hypoxic. Upon re-assessment in the ED, she was noted to have a poor pulse ox wave form due to cold extremities and repeat evaluation was found to be satting high 90s on RA. In the ED, she quickly recovered to her baseline after fluids. History per family is notable for receiving her covid vaccine the day prior and poor PO intake that day. Labs are notable for elevated trops likely type II MI in the setting of syncope and initial elevation in lactate that has normalized.  Differential diagnosis include hypovolemia, vasovagal syncope, arrhythmia. Most likely vagally mediated with inappropriate response. TTE showed EF of 60% with evidence of impaired relaxation of left ventricle. Repeat orthostatics after IVF were normal. Pt was discharged home on 11/18 with a josé migueltch and instructions to follow up with PCP after 2 weeks were made.    Pertinent Physical Exam At Time of Discharge  Physical Exam:  General: elderly female, alert, conversant, in no apparent distress  HEENT: normocephalic, atraumatic, EOMI, no scleral icterus  CV: RRR, no murmurs  Pulm: CTAB, no wheezes or crackles, no increased work of breathing  Abd: soft, non tender, non distended  : no lugo   Ext: warm, no lower  extremity edema  Skin: no rashes  Neuro: moving all extremities  Psych: normal affect      Home Medications     Medication List      CONTINUE taking these medications     ascorbic acid 500 mg tablet; Commonly known as: Vitamin C   aspirin 81 mg chewable tablet   Boost 0.04 gram- 1 kcal/mL liquid; Generic drug: food supplemt,   lactose-reduced   cholecalciferol 50 mcg (2,000 unit) capsule; Commonly known as: Vitamin   D-3   cranberry 500 mg capsule   docusate sodium 100 mg capsule; Commonly known as: Colace   lactobacillus acidophilus & bulgar 1 million cell chewable tablet;   Commonly known as: Lactinex   rosuvastatin 20 mg tablet; Commonly known as: Crestor     STOP taking these medications     cetirizine 10 mg chewable tablet; Commonly known as: ZyrTEC   polyethylene glycol 17 gram/dose powder; Commonly known as: Glycolax,   Miralax   pramoxine-menthol 1-1 % cream       Outpatient Follow-Up  Future Appointments   Date Time Provider Department Center   [id number]   12/6/2023  1:00 PM Sherrell Licona MD [id number]       Rachana Camarena MD

## 2023-11-18 NOTE — CARE PLAN
The clinical goals for the shift include remain free from falls during shift      Problem: Fall/Injury  Goal: Not fall by end of shift  Outcome: Progressing  Goal: Be free from injury by end of the shift  Outcome: Progressing     Problem: Skin  Goal: Decreased wound size/increased tissue granulation at next dressing change  Outcome: Progressing  Goal: Prevent/manage excess moisture  Outcome: Progressing  Goal: Prevent/minimize sheer/friction injuries  Outcome: Progressing     Problem: Respiratory  Goal: No signs of respiratory distress (eg. Use of accessory muscles. Peds grunting)  Outcome: Progressing

## 2023-11-20 LAB
BACTERIA BLD CULT: NORMAL
BACTERIA BLD CULT: NORMAL

## 2023-12-06 ENCOUNTER — OFFICE VISIT (OUTPATIENT)
Dept: OBSTETRICS AND GYNECOLOGY | Facility: CLINIC | Age: 83
End: 2023-12-06
Payer: MEDICARE

## 2023-12-06 NOTE — PROGRESS NOTES
Referred by: ***     PCP  Melinda Johnson MD         CHIEF COMPLAINT:  pelvic organ prolapse, Radha         HISTORY OF PRESENT ILLNESS:  This is a  83 y.o. y.o. female who presents with ***    The following were reviewed to gain additional history:  External notes: discharge summary from recent admission hypertension COPD, severe dementia, TIA who presented to us after an episode of loss of consciousness.   Test results: Cr 0.84 11/18/23         Specifically, she describes the following pelvic floor symptoms:          Prolapse: {yes,no:21211}       - Splinting to urinate: {yes,no:21211}       - Splinting for bowel movement/stool trapping: {yes,no:21211}              Incontinence:  {yes,no:21211}             {types of incontinence:19271}              Urinary Symptoms:       - Frequency:  {yes,no:21211}             # Voids:         - Nocturia: {yes,no:21211}             # Voids:         - Urgency:  {yes,no:21211}       - Incomplete emptying:  {YES wildcard/NO:60}       - Hesitancy:  {YES wildcard/NO:60}       - Pain with voiding:  {YES wildcard/NO:60}       - Excessive fluid intake: {YES wildcard/NO:60}               History:       - Recurrent UTI:  {YES wildcard/NO:60}       - Hematuria:  {YES wildcard/NO:60}       - Stones:  {YES wildcard/NO:60}       - Kidney Disease:  {YES wildcard/NO:60}                  Bowel Symptoms:       - Regular: {yes,no:21211}       - Diarrhea:{yes,no:21211}       - Constipation: {yes,no:21211}       - Fecal Incontinence:  {yes,no:21211}       - Flatus Incontinence:  {yes,no:21211}       - Fecal urgency:   {yes,no:21211}    Past medical and surgical hx reviewed - pertinent for ***        Gyn History:  - Menopausal: {yes,no:21211}           Postmenopausal bleeding: {YES wildcard/NO:60}  - HRT: {YES wildcard/NO:60}  - Pap up to date: {YES wildcard/NO:60}   History of abnormal pap: {YES wildcard/NO:60}  - Sexually active:  {yes,no:21211}  Dyspareunia: {yes,no:21211}   Other issues: ***  -  Number of prior vaginal deliveries: ***   Number of prior operative deliveries ***   Prior OASI? ***  - Number of prior c-sections: ***    - Mammogram up to date: {YES (/NO:60}  - Colonoscopy up to date: {YES (/NO:60}    OB History    No obstetric history on file.               PHYSICAL EXAMINATION:  No LMP recorded.  There is no height or weight on file to calculate BMI.  There were no vitals taken for this visit.  General Appearance: well appearing  Neuro: Alert and oriented   HEENT: mucous membranes moist, neck supple  Resp: No respiratory distress, normal work of breathing  MSK: normal range of motion, gait appropriate    Pelvic:  Genitourinary: normal external genitalia, Bartholin's glands negative, [place]'s glands negative  Urethra: normal meatus, non-tender, no periurethral mass  Vaginal mucosa *** normal  Cervix surgically absent*** normal  Uterus surgically absent*** normal size, non-tender, mobile  Adnexae *** negative nontender, no masses  Atrophy {POSITIVE/NEGATIVE:70130}    CST {POSITIVE/NEGATIVE:45477}  Pelvic floor muscle contraction  ***/5    POP-Q (in supine position):       Aa ***     Ba ***     C ***              gh ***     pb ***     tvl ***              Ap ***     Bp ***     D ***    Rectal: no hemorrhoids, fissures or masses***    PVR (by Ultrasound): ***   Urine dip: No results found for this or any previous visit.         IMPRESSION AND PLAN:  Kate Boseon is a 83 y.o. who presents with ***    ***    All questions and concerns were answered and addressed.  The patient expressed understanding and agrees with the plan.     12/6/2023

## 2023-12-13 LAB — HOLD SPECIMEN: NORMAL

## 2023-12-21 LAB — BODY SURFACE AREA: 1.35 M2

## 2023-12-21 PROCEDURE — 93272 ECG/REVIEW INTERPRET ONLY: CPT | Performed by: INTERNAL MEDICINE

## 2024-01-26 ENCOUNTER — HOSPITAL ENCOUNTER (OUTPATIENT)
Dept: VASCULAR MEDICINE | Facility: HOSPITAL | Age: 84
Discharge: HOME | End: 2024-01-26
Payer: MEDICARE

## 2024-01-26 DIAGNOSIS — I73.9 PERIPHERAL VASCULAR DISEASE, UNSPECIFIED (CMS-HCC): ICD-10-CM

## 2024-01-26 DIAGNOSIS — L97.521: ICD-10-CM

## 2024-01-26 DIAGNOSIS — I83.025: ICD-10-CM

## 2024-01-26 PROCEDURE — 93922 UPR/L XTREMITY ART 2 LEVELS: CPT

## 2024-01-26 PROCEDURE — 93922 UPR/L XTREMITY ART 2 LEVELS: CPT | Performed by: INTERNAL MEDICINE

## 2024-02-26 ENCOUNTER — OFFICE VISIT (OUTPATIENT)
Dept: OBSTETRICS AND GYNECOLOGY | Facility: CLINIC | Age: 84
End: 2024-02-26
Payer: MEDICARE

## 2024-02-26 VITALS
HEART RATE: 73 BPM | DIASTOLIC BLOOD PRESSURE: 60 MMHG | WEIGHT: 136 LBS | HEIGHT: 64 IN | SYSTOLIC BLOOD PRESSURE: 81 MMHG | BODY MASS INDEX: 23.22 KG/M2

## 2024-02-26 DIAGNOSIS — N39.41 URGE URINARY INCONTINENCE: Primary | ICD-10-CM

## 2024-02-26 DIAGNOSIS — K59.00 CONSTIPATION, UNSPECIFIED CONSTIPATION TYPE: ICD-10-CM

## 2024-02-26 PROCEDURE — 99203 OFFICE O/P NEW LOW 30 MIN: CPT | Performed by: NURSE PRACTITIONER

## 2024-02-26 PROCEDURE — 99213 OFFICE O/P EST LOW 20 MIN: CPT | Performed by: NURSE PRACTITIONER

## 2024-02-26 PROCEDURE — 3078F DIAST BP <80 MM HG: CPT | Performed by: NURSE PRACTITIONER

## 2024-02-26 PROCEDURE — 3074F SYST BP LT 130 MM HG: CPT | Performed by: NURSE PRACTITIONER

## 2024-02-26 PROCEDURE — 1126F AMNT PAIN NOTED NONE PRSNT: CPT | Performed by: NURSE PRACTITIONER

## 2024-02-26 PROCEDURE — 1159F MED LIST DOCD IN RCRD: CPT | Performed by: NURSE PRACTITIONER

## 2024-02-26 ASSESSMENT — ENCOUNTER SYMPTOMS
GASTROINTESTINAL NEGATIVE: 1
RESPIRATORY NEGATIVE: 1
PSYCHIATRIC NEGATIVE: 1
NEUROLOGICAL NEGATIVE: 1
CONSTITUTIONAL NEGATIVE: 1

## 2024-02-26 ASSESSMENT — PAIN SCALES - GENERAL: PAINLEVEL: 0-NO PAIN

## 2024-02-26 NOTE — PATIENT INSTRUCTIONS
Pelvic exam done and no evidence of pelvic organ prolapse.   Labial and vaginal tissue appear healthy; no skin breakdown,   She was incontinent during the exam .  Rectum full of hard bulky stool.   Constipation needs addressed.  Bladder had 91 ml in it after being incontinent during exam.    She may need more time to sit on the toilet and make sure all the urine is out   Unable to obtain an urine specimen today   Please add D Mannose supplement to her cranberry supplement for UTI prevention  If future concerns please return to the office

## 2024-02-26 NOTE — PROGRESS NOTES
Referring Physician: No ref. provider found     General medical background: None    Obstetric/Gynecologic History:  Kate Shah has a history of : 1. Para: 1. There is a history of  1  sections. Patient is not currently sexually active.    Past Evaluation and Treatment::  Past evaluation includes: This problem has not been previously evaluated  Past treatment includes: This problem has not been previously treated    Review of Systems   Constitutional: Negative.    HENT: Negative.     Respiratory: Negative.     Gastrointestinal: Negative.    Genitourinary: Negative.    Neurological: Negative.    Psychiatric/Behavioral: Negative.          HPI  Kate, a patient with concerns of prolapse and recurrent urinary tract infections, reports that her appointment with Dr. Licona in December was canceled. She has a complex medical history, including hospital admissions, hypertension, dementia, COPD, transient ischemic attack (TIA), a basal-vagal episode, and heart arrhythmias. Recent laboratory workup did not reveal any urinary tract infections, and urine cultures were not present among the various tests she underwent, which included chest x-rays and vascular imaging.    The caregiver accompanying Kate notes that she used the restroom prior to the current appointment and is presently unable to use the restroom again. Additionally, the caregiver mentions that Kate requires assistance with daily activities due to her medical conditions.    Urinary Incontinence Questions:  Looking back, how long do you think that you have been affected by your urinary complaints? N/a  Have you seen a physician or medical provider for this?No  Have you ever been prescribed any medication for this? No  Have you ever taken any medication for this? No  How many different medications have you tried or been prescribed? 0  How much symptom relief do you, or did you, have taking these medications?None  If you stopped taking  medication(s), what were the reasons why you stopped? (Check all that apply):  n/a  How frustrated are you with your bladder symptoms? Extremely frustrated  What are your goals of therapy? Goal 1:      Testing results:  PVR results are available and reviewed  : 91mL  UA results not available  Laboratory:   Urine dipstick shows    .      History:  Stress Symptoms:   Does coughing gently cause you to lose urine? 0 - Never  Does coughing hard cause you to lose urine? 0 - Never  Does sneezing cause you to lose urine? 0 - Never  Does lifting things cause you to lose urine? 0 - Never  Does bending cause you to lose urine? 0 - Never  Does laughing cause you to lose urine? 0 - Never  Does walking briskly or jogging cause you to lose urine? 0 - Never  Does straining, if you are constipated, cause you lose urine? 0 - Never  Does getting up from a sitting to a standing position cause you to lose urine? 0 - Never  Do you leak urine with intercourse? 0 - Never  Score:  Urge Symptoms:   Some women receive very little warning and suddenly find that they are losing, or about to lose, urine beyond their control. How often does this happen to you? 0 - Never  If you can't find a toilet or find that the toilet is occupied when you have an urge to urinate, how often do you end up lossing urine or wetting yourself? 0 - Never  Do you lose urine when you suddenly have the feeling that your bladder is very full? 0 - Never  Does washing your hands cause you to lose urine? 0 - Never  Does cold weather cause you to lose urine? 0 - Never  Does drinking cold beverages cause you to lose urine? 0 - Never  Do you have frequent bladder infections? 0 - Never  Do you have blood in your urine? 0 - Never  Do you feel like you don't empty your bladder completely? 0 - Never  How often do you urinate at night? 0 - Never  Score:  Vaginal Symptoms:   Do you experience pressure in the lower abdomen? 0 - Never  Do you experience heaviness or dullness in the  pelvic area? 0 - Never  Do you have a bulge or something falling out that you can see or feel in the vaginal area? 0 - Never  Do you have to push up on the vagina or around the rectum to have or complete a bowel movement? 0 - Never  Do you have to push up on the vagina or around the rectum to start or complete urination? 0 - Never  Score:  Rectal Symptoms:   Do you need to strain to have a bowel movement? 2 - Sometimes  Do you feel that you have not completely emptied your bowels at the end of a bowel movement? 1 - Rarely  Do you lose stool beyond your control if your stool is well formed? 0 - Never  Do you lose stool beyond your control if your stool is loose or liquid? 0 - Never  Do you lose gas from the rectum beyond your control? 0 - Never  Do you have pain when you pass your stool? 0 - Never  Do you experience a strong sense of urgency and have to rush to the bathroom to have a bowel movement? 0 - Never  Does part of your bowel ever pass through the rectum and bulge to the bathroom to have a bowel movement? 0 - Never      Physical Exam  Constitutional:       Appearance: Normal appearance.   Genitourinary:      Vulva, bladder, rectum and urethral meatus normal.      Genitourinary Comments: - Prolapse: Absent  - Rectal examination: Presence of a long, hard, and impacted stool  - Continence: Incontinent during the exam      Vaginal cuff intact.     No vaginal prolapse present.  HENT:      Head: Normocephalic.   Eyes:      Extraocular Movements: Extraocular movements intact.      Conjunctiva/sclera: Conjunctivae normal.      Pupils: Pupils are equal, round, and reactive to light.   Cardiovascular:      Rate and Rhythm: Normal rate and regular rhythm.      Pulses: Normal pulses.      Heart sounds: Normal heart sounds.   Pulmonary:      Effort: Pulmonary effort is normal.      Breath sounds: Normal breath sounds.   Abdominal:      General: Abdomen is flat. Bowel sounds are normal.      Palpations: Abdomen is soft.    Musculoskeletal:      Cervical back: Normal range of motion.   Neurological:      General: No focal deficit present.      Mental Status: She is alert and oriented to person, place, and time.   Psychiatric:         Mood and Affect: Mood normal.         Behavior: Behavior normal.         Thought Content: Thought content normal.         Judgment: Judgment normal.          Assessment and Plan    No problem-specific Assessment & Plan notes found for this encounter.  Comorbidities include: Benign essential HTN, CAD, HLD, Allergic rhinitis, Chronic rhinitis, Elevated TSH, Anemia, Depression, Osteopenia, Alzheimer's dementia, CVA, Chronic bronchitis, Anorexia    Plan:    1. Urinary Tract Infections (UTI) Management       - Based on labs and history, no current evidence of UTI is present.       - As UTI is not currently diagnosed, no specific treatment has been discussed.    2. Prolapse Concern Management       - Examination did not reveal evidence of prolapse.     3. Bladder Management       - Bladder scan results show 91 mL of urine, which is normal       - Plans include:         - Ongoing monitoring of bladder volume as necessary.         - Efforts to collect a urine sample for further analysis.  Address constipation  with diet and or stool softeners.     KEE Polo   By signing my name below, Nadeen MAURICE scribe attest that this documentation has been prepared under the direction and in the presence of KEE Polo  I, KEE Polo, personally performed the services described in this documentation which was scribed virtually and I confirm that it is both accurate and complete.

## 2024-09-05 ENCOUNTER — HOSPITAL ENCOUNTER (OUTPATIENT)
Facility: HOSPITAL | Age: 84
Setting detail: OBSERVATION
End: 2024-09-05
Attending: EMERGENCY MEDICINE | Admitting: NURSE PRACTITIONER
Payer: MEDICAID

## 2024-09-05 ENCOUNTER — CLINICAL SUPPORT (OUTPATIENT)
Dept: EMERGENCY MEDICINE | Facility: HOSPITAL | Age: 84
End: 2024-09-05
Payer: MEDICAID

## 2024-09-05 ENCOUNTER — APPOINTMENT (OUTPATIENT)
Dept: RADIOLOGY | Facility: HOSPITAL | Age: 84
End: 2024-09-05
Payer: MEDICAID

## 2024-09-05 DIAGNOSIS — R53.1 WEAKNESS: ICD-10-CM

## 2024-09-05 DIAGNOSIS — R26.9 IMPAIRED GAIT: ICD-10-CM

## 2024-09-05 DIAGNOSIS — R79.89 TROPONIN LEVEL ELEVATED: Primary | ICD-10-CM

## 2024-09-05 DIAGNOSIS — N39.0 UTI (URINARY TRACT INFECTION), UNCOMPLICATED: ICD-10-CM

## 2024-09-05 LAB
ALBUMIN SERPL BCP-MCNC: 3.9 G/DL (ref 3.4–5)
ALP SERPL-CCNC: 88 U/L (ref 33–136)
ALT SERPL W P-5'-P-CCNC: 16 U/L (ref 7–45)
ANION GAP SERPL CALC-SCNC: 11 MMOL/L (ref 10–20)
ANION GAP SERPL CALC-SCNC: 8 MMOL/L (ref 10–20)
APPEARANCE UR: CLEAR
AST SERPL W P-5'-P-CCNC: 47 U/L (ref 9–39)
ATRIAL RATE: 75 BPM
BACTERIA #/AREA URNS AUTO: ABNORMAL /HPF
BASOPHILS # BLD AUTO: 0.04 X10*3/UL (ref 0–0.1)
BASOPHILS NFR BLD AUTO: 0.5 %
BILIRUB SERPL-MCNC: 0.4 MG/DL (ref 0–1.2)
BILIRUB UR STRIP.AUTO-MCNC: NEGATIVE MG/DL
BUN SERPL-MCNC: 26 MG/DL (ref 6–23)
BUN SERPL-MCNC: 31 MG/DL (ref 6–23)
CALCIUM SERPL-MCNC: 11.2 MG/DL (ref 8.6–10.6)
CALCIUM SERPL-MCNC: 9.5 MG/DL (ref 8.6–10.6)
CARDIAC TROPONIN I PNL SERPL HS: 96 NG/L (ref 0–34)
CARDIAC TROPONIN I PNL SERPL HS: 98 NG/L (ref 0–34)
CHLORIDE SERPL-SCNC: 107 MMOL/L (ref 98–107)
CHLORIDE SERPL-SCNC: 111 MMOL/L (ref 98–107)
CO2 SERPL-SCNC: 28 MMOL/L (ref 21–32)
CO2 SERPL-SCNC: 28 MMOL/L (ref 21–32)
COLOR UR: YELLOW
CREAT SERPL-MCNC: 0.79 MG/DL (ref 0.5–1.05)
CREAT SERPL-MCNC: 0.91 MG/DL (ref 0.5–1.05)
EGFRCR SERPLBLD CKD-EPI 2021: 63 ML/MIN/1.73M*2
EGFRCR SERPLBLD CKD-EPI 2021: 75 ML/MIN/1.73M*2
EOSINOPHIL # BLD AUTO: 0.37 X10*3/UL (ref 0–0.4)
EOSINOPHIL NFR BLD AUTO: 4.5 %
ERYTHROCYTE [DISTWIDTH] IN BLOOD BY AUTOMATED COUNT: 13.9 % (ref 11.5–14.5)
GLUCOSE BLD MANUAL STRIP-MCNC: 108 MG/DL (ref 74–99)
GLUCOSE BLD MANUAL STRIP-MCNC: 49 MG/DL (ref 74–99)
GLUCOSE BLD MANUAL STRIP-MCNC: 60 MG/DL (ref 74–99)
GLUCOSE BLD MANUAL STRIP-MCNC: 87 MG/DL (ref 74–99)
GLUCOSE SERPL-MCNC: 54 MG/DL (ref 74–99)
GLUCOSE SERPL-MCNC: 70 MG/DL (ref 74–99)
GLUCOSE UR STRIP.AUTO-MCNC: NORMAL MG/DL
HCT VFR BLD AUTO: 47.7 % (ref 36–46)
HGB BLD-MCNC: 15.5 G/DL (ref 12–16)
HOLD SPECIMEN: NORMAL
IMM GRANULOCYTES # BLD AUTO: 0.03 X10*3/UL (ref 0–0.5)
IMM GRANULOCYTES NFR BLD AUTO: 0.4 % (ref 0–0.9)
KETONES UR STRIP.AUTO-MCNC: NEGATIVE MG/DL
LEUKOCYTE ESTERASE UR QL STRIP.AUTO: NEGATIVE
LYMPHOCYTES # BLD AUTO: 1.43 X10*3/UL (ref 0.8–3)
LYMPHOCYTES NFR BLD AUTO: 17.3 %
MCH RBC QN AUTO: 31.4 PG (ref 26–34)
MCHC RBC AUTO-ENTMCNC: 32.5 G/DL (ref 32–36)
MCV RBC AUTO: 97 FL (ref 80–100)
MONOCYTES # BLD AUTO: 0.88 X10*3/UL (ref 0.05–0.8)
MONOCYTES NFR BLD AUTO: 10.6 %
NEUTROPHILS # BLD AUTO: 5.52 X10*3/UL (ref 1.6–5.5)
NEUTROPHILS NFR BLD AUTO: 66.7 %
NITRITE UR QL STRIP.AUTO: ABNORMAL
NRBC BLD-RTO: 0 /100 WBCS (ref 0–0)
P AXIS: 68 DEGREES
P OFFSET: 197 MS
P ONSET: 136 MS
PH UR STRIP.AUTO: 5.5 [PH]
PLATELET # BLD AUTO: 151 X10*3/UL (ref 150–450)
POTASSIUM SERPL-SCNC: 3.8 MMOL/L (ref 3.5–5.3)
POTASSIUM SERPL-SCNC: 3.8 MMOL/L (ref 3.5–5.3)
PR INTERVAL: 174 MS
PROT SERPL-MCNC: 7.9 G/DL (ref 6.4–8.2)
PROT UR STRIP.AUTO-MCNC: ABNORMAL MG/DL
Q ONSET: 223 MS
QRS COUNT: 12 BEATS
QRS DURATION: 86 MS
QT INTERVAL: 376 MS
QTC CALCULATION(BAZETT): 419 MS
QTC FREDERICIA: 404 MS
R AXIS: 24 DEGREES
RBC # BLD AUTO: 4.94 X10*6/UL (ref 4–5.2)
RBC # UR STRIP.AUTO: ABNORMAL /UL
RBC #/AREA URNS AUTO: ABNORMAL /HPF
SARS-COV-2 RNA RESP QL NAA+PROBE: NOT DETECTED
SODIUM SERPL-SCNC: 139 MMOL/L (ref 136–145)
SODIUM SERPL-SCNC: 146 MMOL/L (ref 136–145)
SP GR UR STRIP.AUTO: 1.02
SQUAMOUS #/AREA URNS AUTO: ABNORMAL /HPF
T AXIS: 150 DEGREES
T OFFSET: 411 MS
UROBILINOGEN UR STRIP.AUTO-MCNC: NORMAL MG/DL
VENTRICULAR RATE: 75 BPM
WBC # BLD AUTO: 8.3 X10*3/UL (ref 4.4–11.3)
WBC #/AREA URNS AUTO: ABNORMAL /HPF

## 2024-09-05 PROCEDURE — 70450 CT HEAD/BRAIN W/O DYE: CPT

## 2024-09-05 PROCEDURE — 2500000004 HC RX 250 GENERAL PHARMACY W/ HCPCS (ALT 636 FOR OP/ED): Mod: SE | Performed by: NURSE PRACTITIONER

## 2024-09-05 PROCEDURE — 36415 COLL VENOUS BLD VENIPUNCTURE: CPT | Performed by: EMERGENCY MEDICINE

## 2024-09-05 PROCEDURE — 85025 COMPLETE CBC W/AUTO DIFF WBC: CPT | Performed by: EMERGENCY MEDICINE

## 2024-09-05 PROCEDURE — 99285 EMERGENCY DEPT VISIT HI MDM: CPT

## 2024-09-05 PROCEDURE — 80048 BASIC METABOLIC PNL TOTAL CA: CPT | Mod: CCI | Performed by: NURSE PRACTITIONER

## 2024-09-05 PROCEDURE — 82947 ASSAY GLUCOSE BLOOD QUANT: CPT

## 2024-09-05 PROCEDURE — 96372 THER/PROPH/DIAG INJ SC/IM: CPT | Performed by: NURSE PRACTITIONER

## 2024-09-05 PROCEDURE — 2500000002 HC RX 250 W HCPCS SELF ADMINISTERED DRUGS (ALT 637 FOR MEDICARE OP, ALT 636 FOR OP/ED): Mod: SE

## 2024-09-05 PROCEDURE — 36415 COLL VENOUS BLD VENIPUNCTURE: CPT

## 2024-09-05 PROCEDURE — 99222 1ST HOSP IP/OBS MODERATE 55: CPT | Performed by: NURSE PRACTITIONER

## 2024-09-05 PROCEDURE — 70450 CT HEAD/BRAIN W/O DYE: CPT | Performed by: RADIOLOGY

## 2024-09-05 PROCEDURE — 87635 SARS-COV-2 COVID-19 AMP PRB: CPT

## 2024-09-05 PROCEDURE — G0378 HOSPITAL OBSERVATION PER HR: HCPCS

## 2024-09-05 PROCEDURE — 81001 URINALYSIS AUTO W/SCOPE: CPT | Performed by: EMERGENCY MEDICINE

## 2024-09-05 PROCEDURE — 96360 HYDRATION IV INFUSION INIT: CPT | Mod: 59

## 2024-09-05 PROCEDURE — 93005 ELECTROCARDIOGRAM TRACING: CPT

## 2024-09-05 PROCEDURE — 99285 EMERGENCY DEPT VISIT HI MDM: CPT | Performed by: EMERGENCY MEDICINE

## 2024-09-05 PROCEDURE — 84075 ASSAY ALKALINE PHOSPHATASE: CPT | Performed by: EMERGENCY MEDICINE

## 2024-09-05 PROCEDURE — 84484 ASSAY OF TROPONIN QUANT: CPT

## 2024-09-05 PROCEDURE — 93010 ELECTROCARDIOGRAM REPORT: CPT | Performed by: EMERGENCY MEDICINE

## 2024-09-05 PROCEDURE — 96361 HYDRATE IV INFUSION ADD-ON: CPT | Mod: 59

## 2024-09-05 RX ORDER — ACETAMINOPHEN 500 MG
10 TABLET ORAL NIGHTLY PRN
Status: DISPENSED | OUTPATIENT
Start: 2024-09-05

## 2024-09-05 RX ORDER — POLYETHYLENE GLYCOL 3350 17 G/17G
17 POWDER, FOR SOLUTION ORAL DAILY PRN
Status: ACTIVE | OUTPATIENT
Start: 2024-09-05

## 2024-09-05 RX ORDER — DEXTROSE MONOHYDRATE AND SODIUM CHLORIDE 5; .45 G/100ML; G/100ML
75 INJECTION, SOLUTION INTRAVENOUS CONTINUOUS
Status: ACTIVE | OUTPATIENT
Start: 2024-09-05 | End: 2024-09-06

## 2024-09-05 RX ORDER — ACETAMINOPHEN 325 MG/1
650 TABLET ORAL EVERY 4 HOURS PRN
Status: ACTIVE | OUTPATIENT
Start: 2024-09-05

## 2024-09-05 RX ORDER — HEPARIN SODIUM 5000 [USP'U]/ML
5000 INJECTION, SOLUTION INTRAVENOUS; SUBCUTANEOUS EVERY 8 HOURS
Status: DISPENSED | OUTPATIENT
Start: 2024-09-05

## 2024-09-05 RX ORDER — SULFAMETHOXAZOLE AND TRIMETHOPRIM 800; 160 MG/1; MG/1
1 TABLET ORAL ONCE
Status: COMPLETED | OUTPATIENT
Start: 2024-09-05 | End: 2024-09-05

## 2024-09-05 RX ORDER — ACETAMINOPHEN 650 MG/1
650 SUPPOSITORY RECTAL EVERY 4 HOURS PRN
Status: ACTIVE | OUTPATIENT
Start: 2024-09-05

## 2024-09-05 RX ORDER — ACETAMINOPHEN 160 MG/5ML
650 SOLUTION ORAL EVERY 4 HOURS PRN
Status: ACTIVE | OUTPATIENT
Start: 2024-09-05

## 2024-09-05 ASSESSMENT — COGNITIVE AND FUNCTIONAL STATUS - GENERAL
CLIMB 3 TO 5 STEPS WITH RAILING: TOTAL
DRESSING REGULAR LOWER BODY CLOTHING: TOTAL
DAILY ACTIVITIY SCORE: 12
STANDING UP FROM CHAIR USING ARMS: TOTAL
PERSONAL GROOMING: A LOT
MOBILITY SCORE: 8
TURNING FROM BACK TO SIDE WHILE IN FLAT BAD: A LOT
TOILETING: A LOT
HELP NEEDED FOR BATHING: A LOT
WALKING IN HOSPITAL ROOM: TOTAL
EATING MEALS: A LITTLE
DRESSING REGULAR UPPER BODY CLOTHING: A LOT
MOVING TO AND FROM BED TO CHAIR: TOTAL
MOVING FROM LYING ON BACK TO SITTING ON SIDE OF FLAT BED WITH BEDRAILS: A LOT

## 2024-09-05 ASSESSMENT — ENCOUNTER SYMPTOMS
WEAKNESS: 1
SHORTNESS OF BREATH: 0
CHILLS: 0
ABDOMINAL PAIN: 0
FLANK PAIN: 0
DIARRHEA: 0
FEVER: 0
HEMATURIA: 0
PALPITATIONS: 0
CONSTIPATION: 0
NAUSEA: 0
VOMITING: 0
DYSURIA: 0
FREQUENCY: 0

## 2024-09-05 ASSESSMENT — COLUMBIA-SUICIDE SEVERITY RATING SCALE - C-SSRS
1. IN THE PAST MONTH, HAVE YOU WISHED YOU WERE DEAD OR WISHED YOU COULD GO TO SLEEP AND NOT WAKE UP?: NO
2. HAVE YOU ACTUALLY HAD ANY THOUGHTS OF KILLING YOURSELF?: NO
6. HAVE YOU EVER DONE ANYTHING, STARTED TO DO ANYTHING, OR PREPARED TO DO ANYTHING TO END YOUR LIFE?: NO

## 2024-09-05 ASSESSMENT — PAIN SCALES - GENERAL
PAINLEVEL_OUTOF10: 0 - NO PAIN
PAINLEVEL_OUTOF10: 0 - NO PAIN

## 2024-09-05 ASSESSMENT — PAIN - FUNCTIONAL ASSESSMENT
PAIN_FUNCTIONAL_ASSESSMENT: 0-10
PAIN_FUNCTIONAL_ASSESSMENT: 0-10

## 2024-09-05 NOTE — ED PROVIDER NOTES
CC: Lethargy and Diarrhea     HPI:  Patient is a 83-year-old female with history of hypertension, COPD, severe dementia and history of TIA presenting from [facility] due to family ilial concern of diarrhea and weakness.  Patient is alert and oriented x 3 (to herself, her birthday and her location).  She does not have any medical complaints at this time but when asked about whether she has been able to ambulate for the past day or 2 she reports no.  She feels like her legs feel heavy when asked to lift them against gravity and currently has 4- out of 5 strength in her bilateral lower extremities.  Patient denies any recent fevers or chills and denies any reported diarrhea.  She is not sure of any medications that she has been taking.  She has no obvious external signs of trauma, has a soft abdomen and has a regular pulse with clear lungs to auscultation bilaterally.  Patient denies any rhinorrhea congestion.    Limitations to History: dementia  Additional History Obtained from: SNF, family    PMHx/PSHx:  Per HPI.   - has a past medical history of Chronic obstructive pulmonary disease, unspecified (Multi) (06/18/2013), Nicotine dependence, unspecified, uncomplicated (06/18/2013), and Personal history of transient ischemic attack (TIA), and cerebral infarction without residual deficits.  - has a past surgical history that includes Foot surgery (11/15/2016) and Colonoscopy (11/06/2014).    Social History:  - Tobacco:  reports that she has quit smoking. Her smoking use included cigarettes. She does not have any smokeless tobacco history on file.   - Alcohol:  reports current alcohol use.   - Drugs:  has no history on file for drug use.     Medications: Reviewed in EMR.     Allergies:  Penicillins    ???????????????????????????????????????????????????????????????  Triage Vitals:  T 35.7 °C (96.2 °F)  HR 78  /82  RR 17  O2 96 %      Physical Exam  Vitals and nursing note reviewed.   Constitutional:        General: She is not in acute distress.     Appearance: She is well-developed.   HENT:      Head: Normocephalic and atraumatic.      Mouth/Throat:      Mouth: Mucous membranes are dry.      Pharynx: Oropharynx is clear.   Eyes:      Conjunctiva/sclera: Conjunctivae normal.   Cardiovascular:      Rate and Rhythm: Normal rate and regular rhythm.      Heart sounds: No murmur heard.  Pulmonary:      Effort: Pulmonary effort is normal. No respiratory distress.      Breath sounds: Normal breath sounds. No wheezing, rhonchi or rales.   Abdominal:      General: There is no distension.      Palpations: Abdomen is soft.      Tenderness: There is no abdominal tenderness. There is no guarding or rebound.   Musculoskeletal:         General: No swelling or tenderness.      Cervical back: Neck supple.   Skin:     General: Skin is warm and dry.      Capillary Refill: Capillary refill takes less than 2 seconds.   Neurological:      Mental Status: She is alert and oriented to person, place, and time.      Sensory: No sensory deficit.      Motor: Weakness (of bilateral LE with intact sentation. difficultly with keeping legs antigravity (4/5)) present.      Gait: Gait abnormal (did not ambulate the patient as she felt significantly weak and tired).   Psychiatric:         Mood and Affect: Mood normal.       ???????????????????????????????????????????????????????????????  EKG (per my interpretation):  Sinus rhythm with a rate of 75.  No IL QRS punctation.  No acute ST elevations with ST depressions noted in the depressions in lateral leads that appear old when compared to EKG from November 2023.    ED Course  ED Course as of 09/05/24 2207   Thu Sep 05, 2024   1423 Bernardo (daughter) [phone] [RR]   1555 POTASSIUM: 3.8 [RR]      ED Course User Index  [RR] Collette Mccall MD         Diagnoses as of 09/05/24 2207   Troponin level elevated   UTI (urinary tract infection), uncomplicated   Weakness       Medical Decision Making:  Patient  is a 83-year-old female with history of hypertension, COPD, dementia and previous TIA presenting due to familial concerns of diarrhea and generalized weakness.  Differentials considered but not limited to ACS, UTI, pyelonephritis, electrolyte abnormality, severe dehydration, C. difficile, severe anemia.    Based on the patient's history of physical broad workup was initiated along with CT head and abdominal labs. Patient's lab work was remarkable for a troponin of 89 that down trended to 96.  She continued to deny any focal chest pain or shortness of breath.  She had multiple episodes of hypoglycemia but was asymptomatic.  She is tolerating oral intake well which when daughter called reports that she has had a poor appetite.  Patient does not have any noted SHEBA and is not on any long-acting diabetes medications.  I believe that patient's weakness is likely secondary to her elevated troponin.  Given that the fact that she is asymptomatic, we will hold off on giving the patient aspirin and heparin.  She was given Bactrim given penicillin allergy for UTI noted.  Patient was admitted for cardiac workup in the setting of weakness and glucose checks.  Patient care was overseen by attending physician agrees with the plan and disposition.    External records reviewed: recent inpatient, clinic, and prior ED notes  Diagnostic imaging independently reviewed/interpreted by me (as reflected in MDM) includes: CT Head  Social Determinants Affecting Care:  dementia  Discussion of management with other providers: attending  Prescription Drug Consideration: per inpatient team  Escalation of Care: none    Impression:   Weakness  UTI  Troponin elevation    Disposition: Admitted      Procedures ? SmartLinks last updated 9/5/2024 2:15 PM     Collette Mccall  PGY-2 Emergency Medicine  [facility]     Collette Mccall MD  Resident  09/05/24 8858    I saw and evaluated the patient. I personally obtained  the key and critical portions of the history and physical exam or was physically present for key and critical portions performed by the resident/fellow/JOSHUA. I reviewed the resident/fellow/JOSHUA's documentation and discussed the patient with the resident/fellow/JOSHUA. I agree with the resident/fellow/JOSHUA's medical decision making as documented in the note.    ** Please excuse any errors in grammar or translation related to this dictation. Voice recognition software was utilized to prepare this document. **       Stanford Wise MD  [facility] Emergency Medicine        Stanford Wise MD  09/06/24 1515

## 2024-09-05 NOTE — ED TRIAGE NOTES
Pt presents with c/o lethargy for a few days with diarrhea. Pt has Dementia and is from [facility]. Per EMS, family wanted pt sent to ED for further evaluation. Pt voices no complaints at this time.

## 2024-09-06 LAB
ANION GAP SERPL CALC-SCNC: 10 MMOL/L (ref 10–20)
BUN SERPL-MCNC: 20 MG/DL (ref 6–23)
CALCIUM SERPL-MCNC: 9.6 MG/DL (ref 8.6–10.6)
CHLORIDE SERPL-SCNC: 105 MMOL/L (ref 98–107)
CO2 SERPL-SCNC: 28 MMOL/L (ref 21–32)
CREAT SERPL-MCNC: 0.83 MG/DL (ref 0.5–1.05)
EGFRCR SERPLBLD CKD-EPI 2021: 70 ML/MIN/1.73M*2
GLUCOSE BLD MANUAL STRIP-MCNC: 106 MG/DL (ref 74–99)
GLUCOSE BLD MANUAL STRIP-MCNC: 72 MG/DL (ref 74–99)
GLUCOSE BLD MANUAL STRIP-MCNC: 81 MG/DL (ref 74–99)
GLUCOSE BLD MANUAL STRIP-MCNC: 85 MG/DL (ref 74–99)
GLUCOSE BLD MANUAL STRIP-MCNC: 87 MG/DL (ref 74–99)
GLUCOSE SERPL-MCNC: 69 MG/DL (ref 74–99)
POTASSIUM SERPL-SCNC: 3.6 MMOL/L (ref 3.5–5.3)
SODIUM SERPL-SCNC: 139 MMOL/L (ref 136–145)

## 2024-09-06 PROCEDURE — 96372 THER/PROPH/DIAG INJ SC/IM: CPT | Performed by: NURSE PRACTITIONER

## 2024-09-06 PROCEDURE — 82947 ASSAY GLUCOSE BLOOD QUANT: CPT

## 2024-09-06 PROCEDURE — 80048 BASIC METABOLIC PNL TOTAL CA: CPT | Performed by: NURSE PRACTITIONER

## 2024-09-06 PROCEDURE — 2500000002 HC RX 250 W HCPCS SELF ADMINISTERED DRUGS (ALT 637 FOR MEDICARE OP, ALT 636 FOR OP/ED): Mod: SE | Performed by: STUDENT IN AN ORGANIZED HEALTH CARE EDUCATION/TRAINING PROGRAM

## 2024-09-06 PROCEDURE — 99233 SBSQ HOSP IP/OBS HIGH 50: CPT | Performed by: STUDENT IN AN ORGANIZED HEALTH CARE EDUCATION/TRAINING PROGRAM

## 2024-09-06 PROCEDURE — G0378 HOSPITAL OBSERVATION PER HR: HCPCS

## 2024-09-06 PROCEDURE — 2500000004 HC RX 250 GENERAL PHARMACY W/ HCPCS (ALT 636 FOR OP/ED): Mod: SE | Performed by: NURSE PRACTITIONER

## 2024-09-06 PROCEDURE — 96361 HYDRATE IV INFUSION ADD-ON: CPT | Mod: 59

## 2024-09-06 PROCEDURE — 36415 COLL VENOUS BLD VENIPUNCTURE: CPT | Performed by: NURSE PRACTITIONER

## 2024-09-06 RX ORDER — SULFAMETHOXAZOLE AND TRIMETHOPRIM 800; 160 MG/1; MG/1
1 TABLET ORAL EVERY 12 HOURS SCHEDULED
Status: DISPENSED | OUTPATIENT
Start: 2024-09-06

## 2024-09-06 ASSESSMENT — COGNITIVE AND FUNCTIONAL STATUS - GENERAL
PERSONAL GROOMING: A LITTLE
TURNING FROM BACK TO SIDE WHILE IN FLAT BAD: A LOT
DRESSING REGULAR UPPER BODY CLOTHING: A LOT
MOVING TO AND FROM BED TO CHAIR: A LOT
TURNING FROM BACK TO SIDE WHILE IN FLAT BAD: A LITTLE
CLIMB 3 TO 5 STEPS WITH RAILING: TOTAL
TOILETING: A LITTLE
EATING MEALS: A LITTLE
DAILY ACTIVITIY SCORE: 17
HELP NEEDED FOR BATHING: A LOT
STANDING UP FROM CHAIR USING ARMS: A LOT
MOVING FROM LYING ON BACK TO SITTING ON SIDE OF FLAT BED WITH BEDRAILS: A LOT
MOVING FROM LYING ON BACK TO SITTING ON SIDE OF FLAT BED WITH BEDRAILS: A LITTLE
CLIMB 3 TO 5 STEPS WITH RAILING: TOTAL
MOBILITY SCORE: 8
DAILY ACTIVITIY SCORE: 13
WALKING IN HOSPITAL ROOM: TOTAL
HELP NEEDED FOR BATHING: A LOT
MOBILITY SCORE: 12
DRESSING REGULAR LOWER BODY CLOTHING: A LOT
DRESSING REGULAR LOWER BODY CLOTHING: A LITTLE
STANDING UP FROM CHAIR USING ARMS: TOTAL
TOILETING: A LOT
DRESSING REGULAR UPPER BODY CLOTHING: A LITTLE
PERSONAL GROOMING: A LOT
WALKING IN HOSPITAL ROOM: TOTAL
EATING MEALS: A LITTLE
MOVING TO AND FROM BED TO CHAIR: TOTAL

## 2024-09-06 ASSESSMENT — PAIN - FUNCTIONAL ASSESSMENT: PAIN_FUNCTIONAL_ASSESSMENT: 0-10

## 2024-09-06 ASSESSMENT — PAIN SCALES - GENERAL
PAINLEVEL_OUTOF10: 0 - NO PAIN
PAINLEVEL_OUTOF10: 0 - NO PAIN

## 2024-09-06 NOTE — CARE PLAN
The patient's goals for the shift include      The clinical goals for the shift include Pt will remain safe and free from fall/injury during shift    Over the shift, the patient did not make progress toward the following goals. Barriers to progression include   Problem: Fall/Injury  Goal: Not fall by end of shift  Outcome: Progressing   . Recommendations to address these barriers include   Problem: Fall/Injury  Goal: Verbalize understanding of personal risk factors for fall in the hospital  Outcome: Progressing     Problem: Fall/Injury  Goal: Be free from injury by end of the shift  Outcome: Progressing   .

## 2024-09-06 NOTE — NURSING NOTE
Pt arrived to unit. A&Ox2. RA. No pain reported. No belongings or medications with patient. Pt is from [facility]. Bed alarm is on. Call light within reach.

## 2024-09-06 NOTE — PROGRESS NOTES
"Kate Shah is a 84 y.o. female on hospital day 0 of admission presenting with Elevated troponin.    Subjective     The patient states she has some right leg pain, for which she thinks is from the bed. Otherwise, denies any SOB or chest pain.    Objective     GENERAL APPEARANCE: A&Ox2, appears in no acute distress  HEAD: normocephalic, atraumatic  THROAT: Oral cavity and pharynx normal. No inflammation, swelling, exudate, or lesions.  NECK: Neck supple, non-tender without lymphadenopathy, masses or thyromegaly.  CARDIAC: Normal S1 and S2. No S3, S4 or murmurs. Rhythm is regular. There is no peripheral edema, cyanosis or pallor. Extremities are warm and well perfused. No carotid bruits.  LUNGS: Clear to auscultation bilaterally without rales, rhonchi, wheezing or diminished breath sounds.  ABDOMEN: Positive bowel sounds. Soft, nondistended, nontender. No guarding or rebound. No masses.  EXTREMITIES: No significant deformity or joint abnormality. No edema. Peripheral pulses intact. No varicosities.  SKIN: Skin normal color, texture and turgor with no lesions or rash  PSYCHIATRIC: oriented to person, place,     Last Recorded Vitals  Blood pressure 98/57, pulse 66, temperature 36.6 °C (97.9 °F), resp. rate 18, height 1.626 m (5' 4\"), weight 56.7 kg (125 lb), SpO2 93%.  Intake/Output last 3 Shifts:  No intake/output data recorded.    Relevant Results  Lab Results   Component Value Date    WBC 8.3 09/05/2024    HGB 15.5 09/05/2024    HCT 47.7 (H) 09/05/2024    MCV 97 09/05/2024     09/05/2024      Lab Results   Component Value Date    GLUCOSE 69 (L) 09/06/2024    CALCIUM 9.6 09/06/2024     09/06/2024    K 3.6 09/06/2024    CO2 28 09/06/2024     09/06/2024    BUN 20 09/06/2024    CREATININE 0.83 09/06/2024     Scheduled medications  heparin (porcine), 5,000 Units, subcutaneous, q8h  sulfamethoxazole-trimethoprim, 1 tablet, oral, q12h MICHELLE      Continuous medications     PRN medications  PRN " medications: acetaminophen **OR** acetaminophen **OR** acetaminophen, melatonin, polyethylene glycol    Assessment/Plan     Elevated troponin  - EKG without acute ischemic changes  - No CP, SOB, N/V  - Mildly elevated, downtrending  - Telemetry  - Doubt ACS at this time     Hypoglycemia (resolved)  - BGL on arrival low, improved with PO intake     UTI  - UA +ve nitrites, bacteria  - Bactrim started in ED, continue  - Follow cultures     r/o Hypernatremia  r/o Hyperchloremia  r/o Hypercalcemia  r/o Hypokalemia  - Repeat BMP showed normal values     Dementia  - No acute concerns  - At baseline per grand daughter      Code status: FULL CODE  Dispo: Cont. Abx, PT/OT    Pavel Dickey MD     The patient encounter includes all but not limited to; Evaluation of laboratory results, pertinent imaging, and vital signs. Daily updates are discussed with any consulting services and family/medical power of  as needed. The patient's discharge  process begins at admission and daily contact with the patient's TCC and SW is pertinent in their efficient and safe discharge.

## 2024-09-06 NOTE — H&P
History Of Present Illness  Kate Shah is a 84 y.o. female with a past medical history of COPD, HTN, TIA, and severe dementia who presented to the ED from [facility] for diarrhea. She currently voices no complaints, including fever, chills, chest pain, shortness of breath, nausea, or vomiting. She is joined at the bedside by a granddaughter who reports the patient is at baseline mental status. The patient is a poor historian due to her dementia and is unable to report her home medications.      Past Medical History  Past Medical History:   Diagnosis Date    Chronic obstructive pulmonary disease, unspecified (Multi) 06/18/2013    Chronic obstructive pulmonary disease    Nicotine dependence, unspecified, uncomplicated 06/18/2013    Nicotine dependence    Personal history of transient ischemic attack (TIA), and cerebral infarction without residual deficits     History of stroke       Surgical History  Past Surgical History:   Procedure Laterality Date    COLONOSCOPY  11/06/2014    Colonoscopy (Fiberoptic)    FOOT SURGERY  11/15/2016    Foot Surgery        Social History  She reports that she has quit smoking. Her smoking use included cigarettes. She does not have any smokeless tobacco history on file. She reports current alcohol use. No history on file for drug use.    Family History  Family History   Problem Relation Name Age of Onset    No Known Problems Mother      No Known Problems Father          Allergies  Penicillins    Review of Systems   Constitutional:  Negative for chills and fever.   Respiratory:  Negative for shortness of breath.    Cardiovascular:  Negative for chest pain and palpitations.   Gastrointestinal:  Negative for abdominal pain, constipation, diarrhea, nausea and vomiting.   Genitourinary:  Negative for dysuria, flank pain, frequency, hematuria and urgency.   Neurological:  Positive for weakness.   All other systems reviewed and are negative.       Physical Exam  Vitals reviewed.  "  HENT:      Head: Normocephalic and atraumatic.      Mouth/Throat:      Mouth: Mucous membranes are dry.   Cardiovascular:      Rate and Rhythm: Normal rate and regular rhythm.      Heart sounds: Normal heart sounds.   Pulmonary:      Effort: Pulmonary effort is normal.      Breath sounds: Normal air entry.   Abdominal:      General: Bowel sounds are normal.      Palpations: Abdomen is soft.      Tenderness: There is no abdominal tenderness.   Musculoskeletal:         General: No deformity.   Skin:     General: Skin is warm and dry.   Neurological:      General: No focal deficit present.      Mental Status: She is alert. Mental status is at baseline. She is disoriented.      Motor: Weakness (BLE) present.   Psychiatric:         Mood and Affect: Mood normal.         Behavior: Behavior normal.          Last Recorded Vitals  Blood pressure 136/81, pulse 72, temperature 35.7 °C (96.2 °F), temperature source Temporal, resp. rate 14, height 1.626 m (5' 4\"), weight 56.7 kg (125 lb), SpO2 97%.    Relevant Results      Lab Results   Component Value Date    WBC 8.3 09/05/2024    HGB 15.5 09/05/2024    HCT 47.7 (H) 09/05/2024    MCV 97 09/05/2024     09/05/2024     Lab Results   Component Value Date    GLUCOSE 54 (LL) 09/05/2024    CALCIUM 11.2 (H) 09/05/2024     (H) 09/05/2024    K 3.8 09/05/2024    CO2 28 09/05/2024     (H) 09/05/2024    BUN 31 (H) 09/05/2024    CREATININE 0.91 09/05/2024     CT head wo IV contrast  Narrative: Interpreted By:  Beltran Coleman and Liller Gregory   STUDY:  CT HEAD WO IV CONTRAST;  9/5/2024 3:54 pm      INDICATION:  Signs/Symptoms:eval for AMS.      COMPARISON:  CT head 10/01/2018      ACCESSION NUMBER(S):  [id number]      ORDERING CLINICIAN:  HENRRY JIMÉNEZ      TECHNIQUE:  Noncontrast axial CT scan of head was performed. Angled reformats in  brain and bone windows were generated. The images were reviewed in  bone, brain, blood and soft tissue windows.    "   FINDINGS:  Parenchyma:  The grey-white differentiation is intact. There is no  mass effect or midline shift.  There is no intraparenchymal  hemorrhage. Mild periventricular deep white matter hypodensities  which are nonspecific but most likely represent sequela of chronic  microvascular ischemic change overall, similar when compared to prior  examination from 2018.      CSF Spaces: The ventricles, sulci and basal cisterns are within  normal limits. There is no abnormal extraaxial fluid collection.      There are no calvarial fractures.      Paranasal sinuses and mastoids: Visualized paranasal sinuses and  mastoid air cells are essentially clear.      Impression: No acute intracranial pathology. Findings of probable chronic small  vessel ischemic changes and age related diffuse cerebral volume loss.      I personally reviewed the images/study and I agree with the findings  as stated above by resident physician, Dr. Woodrow Doshi.      MACRO:  none      Signed by: Beltran Coleman 9/5/2024 4:00 PM  Dictation workstation:   [id number]    ED Medication Administration from 09/05/2024 1255 to 09/05/2024 2031         Date/Time Order Dose Route Action Action by     09/05/2024 2028 EDT heparin (porcine) injection 5,000 Units 5,000 Units subcutaneous Given Arnoto, V     09/05/2024 2028 EDT sulfamethoxazole-trimethoprim (Bactrim DS) 800-160 mg per tablet 1 tablet 1 tablet oral Given Arnoto, V               Assessment/Plan   Assessment & Plan  Elevated troponin      #Elevated troponin  -EKG without acute ischemic changes  -No CP, SOB, N/V  -Mildly elevated, downtrending  -Telemetry  -Doubt ACS at this time    #Hypoglycemia (resolved)  -BGL on arrival low, improved with PO intake    #UTI  -UA +ve nitrites, bacteria  -Bactrim started in ED, continue  -Follow cultures    #r/o Hypernatremia  #r/o Hyperchloremia  #r/o Hypercalcemia  #r/o Hypokalemia  -Repeat BMP now to r/o flush contamination  -Will give D5 1/2NS @ 75 mL/hr  overnight    #Dementia  -No acute concerns  -At baseline per grand daughter               Woodrow Lira, APRN-CNP

## 2024-09-06 NOTE — CARE PLAN
The patient's goals for the shift include      The clinical goals for the shift include Pt will remain safe and free from fall/injury during shift    Over the shift, the patient did not make progress toward the following goals. Barriers to progression include   Problem: Skin  Goal: Prevent/minimize sheer/friction injuries  Outcome: Progressing   . Recommendations to address these barriers include   Problem: Skin  Goal: Prevent/manage excess moisture  9/6/2024 1240 by Aditi Peña RN  Flowsheets (Taken 9/6/2024 1240)  Prevent/manage excess moisture:   Cleanse incontinence/protect with barrier cream   Moisturize dry skin  9/6/2024 1240 by Aditi Peña RN  Outcome: Progressing  Flowsheets (Taken 9/6/2024 1240)  Prevent/manage excess moisture:   Cleanse incontinence/protect with barrier cream   Moisturize dry skin   .

## 2024-09-06 NOTE — NURSING NOTE
End of Shift Note    Pt is A&Ox2-3. VS are stable. Medication was administered and tolerated. Pt did not complained of pain. Bed is at lowest position, and call light is within reach.    Aditi Peña RN

## 2024-09-07 LAB
25(OH)D3 SERPL-MCNC: 77 NG/ML (ref 30–100)
ALBUMIN SERPL BCP-MCNC: 3.4 G/DL (ref 3.4–5)
ALP SERPL-CCNC: 70 U/L (ref 33–136)
ALT SERPL W P-5'-P-CCNC: 11 U/L (ref 7–45)
ANION GAP SERPL CALC-SCNC: 13 MMOL/L (ref 10–20)
AST SERPL W P-5'-P-CCNC: 28 U/L (ref 9–39)
BILIRUB SERPL-MCNC: 0.4 MG/DL (ref 0–1.2)
BUN SERPL-MCNC: 13 MG/DL (ref 6–23)
CALCIUM SERPL-MCNC: 9.6 MG/DL (ref 8.6–10.6)
CHLORIDE SERPL-SCNC: 106 MMOL/L (ref 98–107)
CO2 SERPL-SCNC: 24 MMOL/L (ref 21–32)
CREAT SERPL-MCNC: 0.87 MG/DL (ref 0.5–1.05)
EGFRCR SERPLBLD CKD-EPI 2021: 67 ML/MIN/1.73M*2
ERYTHROCYTE [DISTWIDTH] IN BLOOD BY AUTOMATED COUNT: 13.5 % (ref 11.5–14.5)
GLUCOSE BLD MANUAL STRIP-MCNC: 110 MG/DL (ref 74–99)
GLUCOSE BLD MANUAL STRIP-MCNC: 86 MG/DL (ref 74–99)
GLUCOSE BLD MANUAL STRIP-MCNC: 95 MG/DL (ref 74–99)
GLUCOSE SERPL-MCNC: 63 MG/DL (ref 74–99)
HCT VFR BLD AUTO: 40 % (ref 36–46)
HGB BLD-MCNC: 13.6 G/DL (ref 12–16)
MCH RBC QN AUTO: 31.7 PG (ref 26–34)
MCHC RBC AUTO-ENTMCNC: 34 G/DL (ref 32–36)
MCV RBC AUTO: 93 FL (ref 80–100)
NRBC BLD-RTO: 0 /100 WBCS (ref 0–0)
PLATELET # BLD AUTO: 114 X10*3/UL (ref 150–450)
POTASSIUM SERPL-SCNC: 3.7 MMOL/L (ref 3.5–5.3)
PROT SERPL-MCNC: 6.6 G/DL (ref 6.4–8.2)
RBC # BLD AUTO: 4.29 X10*6/UL (ref 4–5.2)
SODIUM SERPL-SCNC: 139 MMOL/L (ref 136–145)
T4 FREE SERPL-MCNC: 1.14 NG/DL (ref 0.78–1.48)
TSH SERPL-ACNC: 5.44 MIU/L (ref 0.44–3.98)
VIT B12 SERPL-MCNC: 354 PG/ML (ref 211–911)
WBC # BLD AUTO: 4.9 X10*3/UL (ref 4.4–11.3)

## 2024-09-07 PROCEDURE — 84439 ASSAY OF FREE THYROXINE: CPT | Performed by: STUDENT IN AN ORGANIZED HEALTH CARE EDUCATION/TRAINING PROGRAM

## 2024-09-07 PROCEDURE — 84443 ASSAY THYROID STIM HORMONE: CPT | Performed by: STUDENT IN AN ORGANIZED HEALTH CARE EDUCATION/TRAINING PROGRAM

## 2024-09-07 PROCEDURE — 36415 COLL VENOUS BLD VENIPUNCTURE: CPT | Performed by: STUDENT IN AN ORGANIZED HEALTH CARE EDUCATION/TRAINING PROGRAM

## 2024-09-07 PROCEDURE — 85027 COMPLETE CBC AUTOMATED: CPT | Performed by: STUDENT IN AN ORGANIZED HEALTH CARE EDUCATION/TRAINING PROGRAM

## 2024-09-07 PROCEDURE — G0378 HOSPITAL OBSERVATION PER HR: HCPCS

## 2024-09-07 PROCEDURE — 82306 VITAMIN D 25 HYDROXY: CPT | Performed by: STUDENT IN AN ORGANIZED HEALTH CARE EDUCATION/TRAINING PROGRAM

## 2024-09-07 PROCEDURE — 2500000002 HC RX 250 W HCPCS SELF ADMINISTERED DRUGS (ALT 637 FOR MEDICARE OP, ALT 636 FOR OP/ED): Mod: SE | Performed by: STUDENT IN AN ORGANIZED HEALTH CARE EDUCATION/TRAINING PROGRAM

## 2024-09-07 PROCEDURE — 99232 SBSQ HOSP IP/OBS MODERATE 35: CPT | Performed by: STUDENT IN AN ORGANIZED HEALTH CARE EDUCATION/TRAINING PROGRAM

## 2024-09-07 PROCEDURE — 80053 COMPREHEN METABOLIC PANEL: CPT | Performed by: STUDENT IN AN ORGANIZED HEALTH CARE EDUCATION/TRAINING PROGRAM

## 2024-09-07 PROCEDURE — 2500000004 HC RX 250 GENERAL PHARMACY W/ HCPCS (ALT 636 FOR OP/ED): Mod: SE | Performed by: NURSE PRACTITIONER

## 2024-09-07 PROCEDURE — 84075 ASSAY ALKALINE PHOSPHATASE: CPT | Performed by: STUDENT IN AN ORGANIZED HEALTH CARE EDUCATION/TRAINING PROGRAM

## 2024-09-07 PROCEDURE — 82607 VITAMIN B-12: CPT | Performed by: STUDENT IN AN ORGANIZED HEALTH CARE EDUCATION/TRAINING PROGRAM

## 2024-09-07 PROCEDURE — 82947 ASSAY GLUCOSE BLOOD QUANT: CPT

## 2024-09-07 PROCEDURE — 96372 THER/PROPH/DIAG INJ SC/IM: CPT | Performed by: NURSE PRACTITIONER

## 2024-09-07 PROCEDURE — 2500000001 HC RX 250 WO HCPCS SELF ADMINISTERED DRUGS (ALT 637 FOR MEDICARE OP): Mod: SE | Performed by: NURSE PRACTITIONER

## 2024-09-07 ASSESSMENT — PAIN - FUNCTIONAL ASSESSMENT: PAIN_FUNCTIONAL_ASSESSMENT: 0-10

## 2024-09-07 ASSESSMENT — PAIN SCALES - GENERAL: PAINLEVEL_OUTOF10: 0 - NO PAIN

## 2024-09-07 NOTE — PROGRESS NOTES
"Kate Shah is a 84 y.o. female on hospital day 0 of admission presenting with Elevated troponin.    Subjective     Denies any further leg pain.    Objective     GENERAL APPEARANCE: A&Ox2, appears in no acute distress  HEAD: normocephalic, atraumatic  THROAT: Oral cavity and pharynx normal. No inflammation, swelling, exudate, or lesions.  NECK: Neck supple, non-tender without lymphadenopathy, masses or thyromegaly.  CARDIAC: Normal S1 and S2. No S3, S4 or murmurs. Rhythm is regular. There is no peripheral edema, cyanosis or pallor. Extremities are warm and well perfused. No carotid bruits.  LUNGS: Clear to auscultation bilaterally without rales, rhonchi, wheezing or diminished breath sounds.  ABDOMEN: Positive bowel sounds. Soft, nondistended, nontender. No guarding or rebound. No masses.  EXTREMITIES: No significant deformity or joint abnormality. No edema. Peripheral pulses intact. No varicosities.  SKIN: Skin normal color, texture and turgor with no lesions or rash  PSYCHIATRIC: oriented to person, place,      Last Recorded Vitals  Blood pressure 103/64, pulse 61, temperature 36.4 °C (97.5 °F), temperature source Temporal, resp. rate 16, height 1.626 m (5' 4\"), weight 56.7 kg (125 lb), SpO2 95%.  Intake/Output last 3 Shifts:  I/O last 3 completed shifts:  In: 170 (3 mL/kg) [P.O.:170]  Out: 700 (12.3 mL/kg) [Urine:700 (0.3 mL/kg/hr)]  Weight: 56.7 kg     Relevant Results  Lab Results   Component Value Date    WBC 4.9 09/07/2024    HGB 13.6 09/07/2024    HCT 40.0 09/07/2024    MCV 93 09/07/2024     (L) 09/07/2024      Lab Results   Component Value Date    GLUCOSE 63 (L) 09/07/2024    CALCIUM 9.6 09/07/2024     09/07/2024    K 3.7 09/07/2024    CO2 24 09/07/2024     09/07/2024    BUN 13 09/07/2024    CREATININE 0.87 09/07/2024     Scheduled medications  heparin (porcine), 5,000 Units, subcutaneous, q8h  sulfamethoxazole-trimethoprim, 1 tablet, oral, q12h MICHELLE      Continuous medications   "   PRN medications  PRN medications: acetaminophen **OR** acetaminophen **OR** acetaminophen, melatonin, polyethylene glycol    Assessment/Plan     Elevated troponin  - EKG without acute ischemic changes  - No CP, SOB, N/V  - Mildly elevated, downtrending  - Telemetry  - Doubt ACS at this time  - PT/OT pending     Hypoglycemia (resolved)  - BGL on arrival low, improved with PO intake     UTI  - UA +ve nitrites, bacteria  - Bactrim started in ED, continue  - Follow cultures     r/o Hypernatremia  r/o Hyperchloremia  r/o Hypercalcemia  r/o Hypokalemia  - Repeat BMP showed normal values     Dementia  - No acute concerns  - At baseline per grand daughter        Code status: FULL CODE  Dispo: Cont. Abx, PT/OT     Pavel Dickey MD     The patient encounter includes all but not limited to; Evaluation of laboratory results, pertinent imaging, and vital signs. Daily updates are discussed with any consulting services and family/medical power of  as needed. The patient's discharge  process begins at admission and daily contact with the patient's TCC and SW is pertinent in their efficient and safe discharge.

## 2024-09-07 NOTE — CARE PLAN
Problem: Pain - Adult  Goal: Verbalizes/displays adequate comfort level or baseline comfort level  Outcome: Progressing  Flowsheets (Taken 9/7/2024 0115)  Verbalizes/displays adequate comfort level or baseline comfort level:   Encourage patient to monitor pain and request assistance   Assess pain using appropriate pain scale     Problem: Safety - Adult  Goal: Free from fall injury  Outcome: Progressing     Problem: Discharge Planning  Goal: Discharge to home or other facility with appropriate resources  Outcome: Progressing     Problem: Chronic Conditions and Co-morbidities  Goal: Patient's chronic conditions and co-morbidity symptoms are monitored and maintained or improved  Outcome: Progressing   The patient's goals for the shift include      The clinical goals for the shift include Will maintain safety during shift

## 2024-09-08 VITALS
WEIGHT: 125 LBS | RESPIRATION RATE: 16 BRPM | DIASTOLIC BLOOD PRESSURE: 70 MMHG | BODY MASS INDEX: 21.34 KG/M2 | SYSTOLIC BLOOD PRESSURE: 114 MMHG | TEMPERATURE: 98.1 F | HEART RATE: 65 BPM | HEIGHT: 64 IN | OXYGEN SATURATION: 100 %

## 2024-09-08 PROCEDURE — G0378 HOSPITAL OBSERVATION PER HR: HCPCS

## 2024-09-08 PROCEDURE — 2500000004 HC RX 250 GENERAL PHARMACY W/ HCPCS (ALT 636 FOR OP/ED): Mod: SE | Performed by: NURSE PRACTITIONER

## 2024-09-08 PROCEDURE — 99232 SBSQ HOSP IP/OBS MODERATE 35: CPT | Performed by: STUDENT IN AN ORGANIZED HEALTH CARE EDUCATION/TRAINING PROGRAM

## 2024-09-08 PROCEDURE — 96372 THER/PROPH/DIAG INJ SC/IM: CPT | Performed by: NURSE PRACTITIONER

## 2024-09-08 PROCEDURE — 2500000002 HC RX 250 W HCPCS SELF ADMINISTERED DRUGS (ALT 637 FOR MEDICARE OP, ALT 636 FOR OP/ED): Mod: SE | Performed by: STUDENT IN AN ORGANIZED HEALTH CARE EDUCATION/TRAINING PROGRAM

## 2024-09-08 ASSESSMENT — COGNITIVE AND FUNCTIONAL STATUS - GENERAL
HELP NEEDED FOR BATHING: A LITTLE
DRESSING REGULAR UPPER BODY CLOTHING: A LITTLE
DRESSING REGULAR LOWER BODY CLOTHING: A LOT
PERSONAL GROOMING: A LITTLE
DRESSING REGULAR LOWER BODY CLOTHING: A LOT
CLIMB 3 TO 5 STEPS WITH RAILING: TOTAL
DRESSING REGULAR UPPER BODY CLOTHING: A LITTLE
WALKING IN HOSPITAL ROOM: A LOT
DRESSING REGULAR LOWER BODY CLOTHING: A LOT
CLIMB 3 TO 5 STEPS WITH RAILING: TOTAL
HELP NEEDED FOR BATHING: A LITTLE
DAILY ACTIVITIY SCORE: 14
STANDING UP FROM CHAIR USING ARMS: A LOT
PERSONAL GROOMING: A LITTLE
MOVING TO AND FROM BED TO CHAIR: A LITTLE
STANDING UP FROM CHAIR USING ARMS: A LITTLE
EATING MEALS: A LITTLE
WALKING IN HOSPITAL ROOM: A LOT
TOILETING: A LITTLE
TURNING FROM BACK TO SIDE WHILE IN FLAT BAD: A LITTLE
STANDING UP FROM CHAIR USING ARMS: A LITTLE
DRESSING REGULAR UPPER BODY CLOTHING: A LOT
WALKING IN HOSPITAL ROOM: A LOT
HELP NEEDED FOR BATHING: A LOT
TURNING FROM BACK TO SIDE WHILE IN FLAT BAD: A LITTLE
MOVING TO AND FROM BED TO CHAIR: A LOT
PERSONAL GROOMING: A LITTLE
DAILY ACTIVITIY SCORE: 17
CLIMB 3 TO 5 STEPS WITH RAILING: TOTAL
MOVING TO AND FROM BED TO CHAIR: A LITTLE
TOILETING: A LOT
MOBILITY SCORE: 15
EATING MEALS: A LITTLE
MOBILITY SCORE: 16
DAILY ACTIVITIY SCORE: 17
MOBILITY SCORE: 16
TOILETING: A LITTLE
EATING MEALS: A LITTLE

## 2024-09-08 ASSESSMENT — PAIN SCALES - GENERAL
PAINLEVEL_OUTOF10: 0 - NO PAIN

## 2024-09-08 ASSESSMENT — PAIN - FUNCTIONAL ASSESSMENT: PAIN_FUNCTIONAL_ASSESSMENT: 0-10

## 2024-09-08 NOTE — CARE PLAN
The clinical goals for the shift include pt to sit in chair for an hour during shift    Problem: Fall/Injury  Goal: Not fall by end of shift  Outcome: Progressing  Goal: Be free from injury by end of the shift  Outcome: Progressing  Goal: Verbalize understanding of personal risk factors for fall in the hospital  Outcome: Progressing  Goal: Verbalize understanding of risk factor reduction measures to prevent injury from fall in the home  Outcome: Progressing

## 2024-09-08 NOTE — PROGRESS NOTES
"Kate Shah is a 84 y.o. female on hospital day 0 of admission presenting with Elevated troponin.    Subjective     Spoke with daughter who would rather have the patient come home with home care. Awaiting PT/OT. Likely discharge tomorrow.    Objective     GENERAL APPEARANCE: A&Ox2, appears in no acute distress  HEAD: normocephalic, atraumatic  THROAT: Oral cavity and pharynx normal. No inflammation, swelling, exudate, or lesions.  NECK: Neck supple, non-tender without lymphadenopathy, masses or thyromegaly.  CARDIAC: Normal S1 and S2. No S3, S4 or murmurs. Rhythm is regular. There is no peripheral edema, cyanosis or pallor. Extremities are warm and well perfused. No carotid bruits.  LUNGS: Clear to auscultation bilaterally without rales, rhonchi, wheezing or diminished breath sounds.  ABDOMEN: Positive bowel sounds. Soft, nondistended, nontender. No guarding or rebound. No masses.  EXTREMITIES: No significant deformity or joint abnormality. No edema. Peripheral pulses intact. No varicosities.  SKIN: Skin normal color, texture and turgor with no lesions or rash  PSYCHIATRIC: oriented to person, place,    Last Recorded Vitals  Blood pressure 114/70, pulse 65, temperature 36.7 °C (98.1 °F), resp. rate 16, height 1.626 m (5' 4\"), weight 56.7 kg (125 lb), SpO2 100%.  Intake/Output last 3 Shifts:  I/O last 3 completed shifts:  In: 120 (2.1 mL/kg) [P.O.:120]  Out: 1300 (22.9 mL/kg) [Urine:1300 (0.6 mL/kg/hr)]  Weight: 56.7 kg     Relevant Results  Lab Results   Component Value Date    WBC 4.9 09/07/2024    HGB 13.6 09/07/2024    HCT 40.0 09/07/2024    MCV 93 09/07/2024     (L) 09/07/2024      Lab Results   Component Value Date    GLUCOSE 63 (L) 09/07/2024    CALCIUM 9.6 09/07/2024     09/07/2024    K 3.7 09/07/2024    CO2 24 09/07/2024     09/07/2024    BUN 13 09/07/2024    CREATININE 0.87 09/07/2024     Scheduled medications  heparin (porcine), 5,000 Units, subcutaneous, " q8h  sulfamethoxazole-trimethoprim, 1 tablet, oral, q12h MICHELLE      Continuous medications     PRN medications  PRN medications: acetaminophen **OR** acetaminophen **OR** acetaminophen, melatonin, polyethylene glycol    Assessment/Plan     Elevated troponin  - EKG without acute ischemic changes  - No CP, SOB, N/V  - Mildly elevated, downtrending  - Telemetry  - Doubt ACS at this time  - PT/OT pending     Hypoglycemia (resolved)  - BGL on arrival low, improved with PO intake     UTI  - UA +ve nitrites, bacteria  - Bactrim started in ED, continue  - Follow cultures     r/o Hypernatremia  r/o Hyperchloremia  r/o Hypercalcemia  r/o Hypokalemia  - Repeat BMP showed normal values     Dementia  - No acute concerns  - At baseline per grand daughter        Code status: FULL CODE  Dispo: Cont. Abx, PT/OT, patients daughter stated they will likely have her come home with homecare, likely discharge tomorrow     Pavel Dickey MD     The patient encounter includes all but not limited to; Evaluation of laboratory results, pertinent imaging, and vital signs. Daily updates are discussed with any consulting services and family/medical power of  as needed. The patient's discharge  process begins at admission and daily contact with the patient's TCC and SW is pertinent in their efficient and safe discharge

## 2024-09-08 NOTE — CARE PLAN
The patient's goals for the shift include      The clinical goals for the shift include Pt will not have any falls or injury during the shift. Goal met. Pt did not have any falls or injuries during the shift. Pt was closely monitored with bed alarm on. Pt had no complaints of pain. Most goals met.       Problem: Fall/Injury  Goal: Not fall by end of shift  Outcome: Met  Goal: Be free from injury by end of the shift  Outcome: Met  Goal: Verbalize understanding of personal risk factors for fall in the hospital  Outcome: Met  Goal: Verbalize understanding of risk factor reduction measures to prevent injury from fall in the home  Outcome: Met  Goal: Use assistive devices by end of the shift  Outcome: Met  Goal: Pace activities to prevent fatigue by end of the shift  Outcome: Met     Problem: Skin  Goal: Decreased wound size/increased tissue granulation at next dressing change  Outcome: Met  Flowsheets (Taken 9/8/2024 0649)  Decreased wound size/increased tissue granulation at next dressing change: Protective dressings over bony prominences  Goal: Participates in plan/prevention/treatment measures  Outcome: Met  Flowsheets (Taken 9/8/2024 0649)  Participates in plan/prevention/treatment measures:   Elevate heels   Increase activity/out of bed for meals  Goal: Prevent/manage excess moisture  Outcome: Met  Flowsheets (Taken 9/8/2024 0649)  Prevent/manage excess moisture: Monitor for/manage infection if present  Goal: Prevent/minimize sheer/friction injuries  Outcome: Met  Flowsheets (Taken 9/8/2024 0649)  Prevent/minimize sheer/friction injuries:   HOB 30 degrees or less   Use pull sheet  Goal: Promote/optimize nutrition  Outcome: Met  Flowsheets (Taken 9/8/2024 0649)  Promote/optimize nutrition: Consume > 50% meals/supplements  Goal: Promote skin healing  Outcome: Met  Flowsheets (Taken 9/8/2024 0649)  Promote skin healing: Turn/reposition every 2 hours/use positioning/transfer devices     Problem: Pain - Adult  Goal:  Verbalizes/displays adequate comfort level or baseline comfort level  Outcome: Met     Problem: Safety - Adult  Goal: Free from fall injury  Outcome: Met     Problem: Chronic Conditions and Co-morbidities  Goal: Patient's chronic conditions and co-morbidity symptoms are monitored and maintained or improved  9/8/2024 0650 by Gale Bansal, RN  Outcome: Met  9/8/2024 0649 by Gale Bansal, RN  Outcome: Progressing

## 2024-09-09 ENCOUNTER — TELEPHONE (OUTPATIENT)
Dept: HOME HEALTH SERVICES | Facility: HOME HEALTH | Age: 84
End: 2024-09-09
Payer: MEDICARE

## 2024-09-09 ENCOUNTER — HOME HEALTH ADMISSION (OUTPATIENT)
Dept: HOME HEALTH SERVICES | Facility: HOME HEALTH | Age: 84
End: 2024-09-09
Payer: MEDICAID

## 2024-09-09 VITALS
OXYGEN SATURATION: 97 % | WEIGHT: 125 LBS | HEART RATE: 59 BPM | TEMPERATURE: 97.7 F | HEIGHT: 64 IN | DIASTOLIC BLOOD PRESSURE: 60 MMHG | SYSTOLIC BLOOD PRESSURE: 99 MMHG | RESPIRATION RATE: 17 BRPM | BODY MASS INDEX: 21.34 KG/M2

## 2024-09-09 PROCEDURE — G0378 HOSPITAL OBSERVATION PER HR: HCPCS

## 2024-09-09 PROCEDURE — 97162 PT EVAL MOD COMPLEX 30 MIN: CPT | Mod: GP | Performed by: PHYSICAL THERAPIST

## 2024-09-09 PROCEDURE — 97165 OT EVAL LOW COMPLEX 30 MIN: CPT | Mod: GO

## 2024-09-09 PROCEDURE — 2500000004 HC RX 250 GENERAL PHARMACY W/ HCPCS (ALT 636 FOR OP/ED): Mod: SE | Performed by: STUDENT IN AN ORGANIZED HEALTH CARE EDUCATION/TRAINING PROGRAM

## 2024-09-09 PROCEDURE — 99239 HOSP IP/OBS DSCHRG MGMT >30: CPT | Performed by: STUDENT IN AN ORGANIZED HEALTH CARE EDUCATION/TRAINING PROGRAM

## 2024-09-09 PROCEDURE — 97129 THER IVNTJ 1ST 15 MIN: CPT | Mod: GO

## 2024-09-09 PROCEDURE — 2500000002 HC RX 250 W HCPCS SELF ADMINISTERED DRUGS (ALT 637 FOR MEDICARE OP, ALT 636 FOR OP/ED): Mod: SE | Performed by: STUDENT IN AN ORGANIZED HEALTH CARE EDUCATION/TRAINING PROGRAM

## 2024-09-09 PROCEDURE — 96372 THER/PROPH/DIAG INJ SC/IM: CPT | Performed by: NURSE PRACTITIONER

## 2024-09-09 PROCEDURE — 2500000004 HC RX 250 GENERAL PHARMACY W/ HCPCS (ALT 636 FOR OP/ED): Mod: SE | Performed by: NURSE PRACTITIONER

## 2024-09-09 ASSESSMENT — PAIN SCALES - GENERAL
PAINLEVEL_OUTOF10: 0 - NO PAIN

## 2024-09-09 ASSESSMENT — COGNITIVE AND FUNCTIONAL STATUS - GENERAL
DRESSING REGULAR UPPER BODY CLOTHING: A LOT
CLIMB 3 TO 5 STEPS WITH RAILING: TOTAL
PERSONAL GROOMING: A LITTLE
MOBILITY SCORE: 11
HELP NEEDED FOR BATHING: A LOT
MOVING TO AND FROM BED TO CHAIR: A LOT
DAILY ACTIVITIY SCORE: 13
DRESSING REGULAR LOWER BODY CLOTHING: A LOT
STANDING UP FROM CHAIR USING ARMS: A LOT
TURNING FROM BACK TO SIDE WHILE IN FLAT BAD: A LOT
TOILETING: TOTAL
WALKING IN HOSPITAL ROOM: A LOT
EATING MEALS: A LITTLE
MOVING FROM LYING ON BACK TO SITTING ON SIDE OF FLAT BED WITH BEDRAILS: A LOT

## 2024-09-09 ASSESSMENT — PAIN - FUNCTIONAL ASSESSMENT
PAIN_FUNCTIONAL_ASSESSMENT: 0-10

## 2024-09-09 ASSESSMENT — ACTIVITIES OF DAILY LIVING (ADL): LACK_OF_TRANSPORTATION: NO

## 2024-09-09 NOTE — TELEPHONE ENCOUNTER
This referral has been made a Non Admit with  Home Care due to Patient's Insurance is Out of Network. If you have further questions, feel free to reach out to our office at [phone]. Thank you, [facility].

## 2024-09-09 NOTE — PROGRESS NOTES
Occupational Therapy    Evaluation/Treatment    Patient Name: Kate Shah  MRN: [id number]  Today's Date: 9/9/2024  Time Calculation  Start Time: 0850  Stop Time: 0915  Time Calculation (min): 25 min    Assessment  IP OT Assessment  OT Assessment: Impaired bed mobility, required max assist with supine to sit and sit to supine. Required max assist x 1 from an elevated surface to complete sit to stand transfer. Max assist with lower body ADLs. Pt scored 7 out of 30 on the SLUMS assessment.  Prognosis: Good  Barriers to Discharge: None  Evaluation/Treatment Tolerance: Patient tolerated treatment well  Medical Staff Made Aware: Yes  End of Session Communication: Bedside nurse  End of Session Patient Position: Bed, 3 rail up, Alarm on  Plan:  Treatment Interventions: ADL retraining, Functional transfer training, Endurance training  OT Frequency: 3 times per week  OT Discharge Recommendations: Moderate intensity level of continued care  OT Recommended Transfer Status: Maximum assist, Assist of 1  OT - OK to Discharge: Yes    Subjective     Current Problem:  1. Troponin level elevated        2. UTI (urinary tract infection), uncomplicated        3. Weakness        4. Impaired gait  Referral to Home Care          General:  Reason for Referral: pt presenting with diarrhea but also with elevated troponin  Past Medical History Relevant to Rehab: 84 y.o. female with a past medical history of COPD, HTN, TIA, and severe dementia  Prior to Session Communication: Bedside nurse  Patient Position Received: Bed, 3 rail up, Alarm on  Family/Caregiver Present: No  General Comment: supine with HOB elevated     Precautions:  Medical Precautions: Fall precautions    Pain:  Pain Assessment  Pain Assessment: 0-10  0-10 (Numeric) Pain Score: 0 - No pain      Objective   Cognition:  Overall Cognitive Status: Impaired  Orientation Level: Disoriented to time, Disoriented to situation  Insight: Moderate  Processing Speed: Delayed              Home Living:  Type of Home:  (pt reported living in a house with her mother)  Home Adaptive Equipment: None     Prior Function:  Level of [place]: Independent with ADLs and functional transfers (questionable level of accuracy)  Hand Dominance: Right    ADL:  Eating Assistance: Stand by  Eating Deficit: Setup  UE Dressing Assistance: Minimal  UE Dressing Deficit: Thread RUE, Thread LUE (anticipate)    Activity Tolerance:  Endurance: Decreased tolerance for upright activites    Balance:  Dynamic Sitting Balance  Dynamic Sitting-Balance Support: Bilateral upper extremity supported  Dynamic Sitting-Level of Assistance: Moderate assistance  Dynamic Sitting-Comments: to scoot forward to the EOB  Static Sitting Balance  Static Sitting-Balance Support: Bilateral upper extremity supported  Static Sitting-Level of Assistance: Contact guard  Static Standing Balance  Static Standing-Balance Support: Left upper extremity supported  Static Standing-Level of Assistance: Maximum assistance  Static Standing-Comment/Number of Minutes: able to maintain static standing balance for a few seconds before requesting to sit back down. pt presented with flexed posture in standing.    Bed Mobility/Transfers: Bed Mobility  Bed Mobility: Yes  Bed Mobility 1  Bed Mobility 1: Supine to sitting  Level of Assistance 1: Moderate assistance, Minimal verbal cues  Bed Mobility Comments 1: HOB elevated  Bed Mobility 2  Bed Mobility  2: Sitting to supine  Level of Assistance 2: Moderate assistance, Minimal verbal cues  Bed Mobility Comments 2: HOB elevated   and Transfers  Transfer: Yes  Transfer 1  Transfer From 1: Bed to  Transfer to 1: Stand  Technique 1: Sit to stand, Stand to sit  Transfer Level of Assistance 1: Maximum assistance, Minimal verbal cues  Trials/Comments 1: from and elevated surface    Vision:     and Vision - Complex Assessment  Ocular Range of Motion: Within Functional Limits    Sensation:  Light Touch: No apparent  deficits    Perception:  Inattention/Neglect: Appears intact    Coordination:  Movements are Fluid and Coordinated: Yes     Hand Function:  Hand Function  Gross Grasp: Functional  Coordination: Functional    Extremities: RUE   RUE :  (R shoulder AROM ~ 80 degrees flex. R elbow to digits AROM WFL.), LUE   LUE:  (L shoulder AROM ~ 80 degrees flex. L elbow to digits AROM WFL.)    Outcome Measures: [facility] Daily Activity  Putting on and taking off regular lower body clothing: A lot  Bathing (including washing, rinsing, drying): A lot  Putting on and taking off regular upper body clothing: A lot  Toileting, which includes using toilet, bedpan or urinal: Total  Taking care of personal grooming such as brushing teeth: A little  Eating Meals: A little  Daily Activity - Total Score: 13         ,     OT Adult Other Outcome Measures  SLUMS Total Score: 7    Education Documentation  Body Mechanics, taught by Ronal Allen OT at 9/9/2024  1:47 PM.  Learner: Patient  Readiness: Acceptance  Method: Explanation  Response: Needs Reinforcement    ADL Training, taught by Ronal Allen OT at 9/9/2024  1:47 PM.  Learner: Patient  Readiness: Acceptance  Method: Explanation  Response: Needs Reinforcement    Education Comments  No comments found.        Goals:   Encounter Problems       Encounter Problems (Active)       ADLs       Patient will perform UB and LB bathing  with minimal assist  level of assistance and grab bars. (Progressing)       Start:  09/09/24    Expected End:  09/30/24            Patient with complete lower body dressing with minimal assist  level of assistance donning and doffing all LE clothes  with PRN adaptive equipment while edge of bed  (Progressing)       Start:  09/09/24    Expected End:  09/30/24            Patient will complete toileting including hygiene clothing management/hygiene with minimal assist  level of assistance and grab bars. (Progressing)       Start:  09/09/24    Expected End:  09/30/24           "     BALANCE       Pt will maintain dynamic standing balance during ADL task with minimal assist  level of assistance in order to demonstrate decreased risk of falling and improved postural control. (Progressing)       Start:  09/09/24    Expected End:  09/30/24               COGNITION/SAFETY       Patient will score WFL on standardized cognitive assessment with min verbal cues and within reasonable time frame (Progressing)       Start:  09/09/24    Expected End:  09/30/24            Pt will follow Simple 100% of the time during OT tx session with min v.c . (Progressing)       Start:  09/09/24    Expected End:  09/30/24               EXERCISE/STRENGTHENING       Patient will complete BUE exercises for 10 reps in order to improve strength and activity for ADL performance.  (Progressing)       Start:  09/09/24    Expected End:  09/30/24               MOBILITY       Patient will perform Functional mobility min Household distances/Community Distances with modified independent level of assistance and least restrictive device in order to improve safety and functional mobility. (Progressing)       Start:  09/09/24    Expected End:  09/30/24               TRANSFERS       Patient will perform bed mobility minimal assist  level of assistance and bed rails in order to improve safety and independence with mobility (Progressing)       Start:  09/09/24    Expected End:  09/30/24            Patient will complete sit to stand transfer with minimal assist  level of assistance and least restrictive device in order to improve safety and prepare for out of bed mobility. (Progressing)       Start:  09/09/24    Expected End:  09/30/24                   Treatment Completed on Evaluation  Cognitive Skill Development:  Pt scored 7 out of 30 on the SLUMS that represents \"Dementia\" based on this assessment. Pt presented with impaired short-term memory, impaired command following, and impaired ability to problem solve.     Activities of Daily " Living:     LE Dressing  LE Dressing: Yes  Pants Level of Assistance: Maximum assistance, Minimal verbal cues  LE Dressing Where Assessed: Edge of bed  Initiated donning pants at the side of the bed, deferred pulling pants over hips due to pt's difficulties with bed mobility and transfers, pt required max assist with doffing pants while sitting at the side of the bed.            09/09/24 at 1:48 PM   Ronal Allen OTR/L, OTD  Rehab Office: [phone]

## 2024-09-09 NOTE — CARE PLAN
The patient's goals for the shift include      The clinical goals for the shift include Pt will have no injury or falls during the shift. Goal met, Pt did not have any injury during the shift. Pt was checked and monitored throughout the shift. Pt was rotated throughout the shift. Pt had no complaints of pain. Most goals met.       Problem: Discharge Planning  Goal: Discharge to home or other facility with appropriate resources  9/9/2024 0638 by Gale Bansal RN  Outcome: Adequate for Discharge  9/9/2024 0638 by Gale Bansal RN  Outcome: Progressing     Problem: Chronic Conditions and Co-morbidities  Goal: Patient's chronic conditions and co-morbidity symptoms are monitored and maintained or improved  9/9/2024 0638 by Gale Bansal RN  Outcome: Adequate for Discharge  9/9/2024 0638 by Gale Bansal RN  Outcome: Progressing     Problem: Pain - Adult  Goal: Verbalizes/displays adequate comfort level or baseline comfort level  Outcome: Met     Problem: Fall/Injury  Goal: Not fall by end of shift  Outcome: Met  Goal: Be free from injury by end of the shift  Outcome: Met  Goal: Verbalize understanding of personal risk factors for fall in the hospital  Outcome: Met  Goal: Verbalize understanding of risk factor reduction measures to prevent injury from fall in the home  Outcome: Met  Goal: Use assistive devices by end of the shift  Outcome: Met  Goal: Pace activities to prevent fatigue by end of the shift  Outcome: Met     Problem: Safety - Adult  Goal: Free from fall injury  Outcome: Met

## 2024-09-09 NOTE — PROGRESS NOTES
Physical Therapy    Physical Therapy Evaluation    Patient Name: Kate Shah  MRN: [id number]  Today's Date: 9/9/2024   Time Calculation  Start Time: 0940  Stop Time: 0954  Time Calculation (min): 14 min    Assessment/Plan   PT Assessment  PT Assessment Results: Decreased strength, Decreased range of motion, Impaired balance, Decreased endurance, Decreased mobility, Decreased coordination, Decreased cognition  Rehab Prognosis: Good  Barriers to Participation: Comorbidities  End of Session Communication: Bedside nurse  Assessment Comment: pt is 84 y.o for elevated troponin. pt is at risk for falls and would benefit from skilled services to improve balance and mobility  End of Session Patient Position: Bed, 3 rail up, Alarm on  IP OR SWING BED PT PLAN  Inpatient or Swing Bed: Inpatient  PT Plan  Treatment/Interventions: Bed mobility, Transfer training, Gait training, Stair training, Balance training, Strengthening, Endurance training, Range of motion, Therapeutic exercise, Home exercise program, Therapeutic activity  PT Plan: Ongoing PT  PT Frequency: 3 times per week  PT Discharge Recommendations: Moderate intensity level of continued care  PT Recommended Transfer Status: Assist x1  PT - OK to Discharge: Yes (Eval received and completed. REcs made.)      Subjective   General Visit Information:  General  Reason for Referral: pt presenting with diarrhea but also with elevated troponin  Past Medical History Relevant to Rehab: 84 y.o. female with a past medical history of COPD, HTN, TIA, and severe dementia  Family/Caregiver Present: No  Prior to Session Communication: Bedside nurse  Patient Position Received: Bed, 3 rail up, Alarm on  General Comment: pt supine in bed and willing to participate. pt very cooperative but pleasantly confused.  Home Living:  Home Living  Type of Home:  (pt poor historian stated she lives in a house with brother and sister with 5 stairs in.)  Home Adaptive Equipment:  (pt stated that she  has a bsc, ww ( unsure of accuracy))  Prior Level of Function:  Prior Function Per Pt/Caregiver Report  Level of [place]:  (pt poor historian but stated she uses a ww to ambulate in the house)  Precautions:  Precautions  Medical Precautions: Fall precautions    Vital Signs (Past 2hrs)        Date/Time Vitals Session Patient Position Pulse Resp SpO2 BP MAP (mmHg)    09/09/24 0941 Post PT  Lying  60  --  100 %  104/63  --                         Objective   Pain:  Pain Assessment  Pain Assessment: 0-10  0-10 (Numeric) Pain Score: 0 - No pain  Cognition:  Cognition  Orientation Level: Disoriented to time, Disoriented to situation (pt stated that it was December and had to be given a choice for year 2000 or 2024. chose correctly)  Memory: Within Funtional Limits  Insight: Moderate  Processing Speed: Delayed    General Assessments:  Activity Tolerance  Endurance: Tolerates less than 10 min exercise, no significant change in vital signs  Activity Tolerance Comments: pt fatigued very quickly    Sensation  Light Touch: No apparent deficits       Coordination  Movements are Fluid and Coordinated: Yes    Postural Control  Postural Control: Impaired  Posture Comment: pt with rounded shoulders and forward head. pt sits in PPT    Static Sitting Balance  Static Sitting-Balance Support: Feet supported, Bilateral upper extremity supported  Static Sitting-Level of Assistance: Minimum assistance  Static Sitting-Comment/Number of Minutes: pt sat EOB for 5 mins    Static Standing Balance  Static Standing-Balance Support: Bilateral upper extremity supported  Static Standing-Level of Assistance: Moderate assistance  Static Standing-Comment/Number of Minutes: use of ww  Functional Assessments:  Bed Mobility  Bed Mobility: Yes  Bed Mobility 1  Bed Mobility 1: Supine to sitting, Sitting to supine  Level of Assistance 1: Moderate assistance  Bed Mobility Comments 1: HOB elevated and use of draw sheet    Transfers  Transfer:  Yes  Transfer 1  Transfer From 1: Sit to, Stand to  Transfer to 1: Stand, Sit  Technique 1: Sit to stand, Stand to sit  Transfer Device 1: Walker  Transfer Level of Assistance 1: Maximum assistance  Trials/Comments 1: pt with retrolean    Ambulation/Gait Training  Ambulation/Gait Training Performed: Yes  Ambulation/Gait Training 1  Surface 1: Level tile  Device 1: Rolling walker  Assistance 1: Moderate assistance  Quality of Gait 1: Narrow base of support, Inconsistent stride length, Decreased step length, Shuffling gait, Soft knee(s) (pt with crouched gait, with very little knee extension during swing phase and flat foot.)  Comments/Distance (ft) 1: pt ambulated 10 ft in the room with max assist for turns       Extremity/Trunk Assessments:  RLE   RLE : Exceptions to WFL  Strength RLE  RLE Overall Strength: Greater than or equal to 3/5 as evidenced by functional mobility  LLE   LLE : Exceptions to WFL  Strength LLE  LLE Overall Strength: Greater than or equal to 3/5 as evidenced by functional mobility  Outcome Measures:  [facility] Basic Mobility  Turning from your back to your side while in a flat bed without using bedrails: A lot  Moving from lying on your back to sitting on the side of a flat bed without using bedrails: A lot  Moving to and from bed to chair (including a wheelchair): A lot  Standing up from a chair using your arms (e.g. wheelchair or bedside chair): A lot  To walk in hospital room: A lot  Climbing 3-5 steps with railing: Total  Basic Mobility - Total Score: 11    Encounter Problems       Encounter Problems (Active)       Balance       STG - Maintains dynamic standing balance with upper extremity support with CGA and ww for 3 mins without LOB  (Progressing)       Start:  09/09/24    Expected End:  09/23/24       INTERVENTIONS:  1. Practice standing with minimal support.  2. Educate patient about standing tolerance.  3. Educate patient about independence with gait, transfers, and ADL's.  4. Educate  patient about use of assistive device.  5. Educate patient about self-directed care.         STG - Maintains static sitting balance with upper extremity support for 5 mins with CGA and without LOB  (Progressing)       Start:  09/09/24    Expected End:  09/23/24       INTERVENTIONS:  1. Practice sitting on the edge of a bed/mat with minimal support.  2. Educate patient about maintining total hip precautions while maintaining balance.  3. Educate patient about pressure relief.  4. Educate patient about use of assistive device.            Mobility       STG - Patient will ambulate 50 ft with ww and CGA  (Progressing)       Start:  09/09/24    Expected End:  09/23/24               PT Transfers       STG - Transfer from bed to chair with ww and CGA  (Progressing)       Start:  09/09/24    Expected End:  09/23/24            STG - Patient will perform bed mobility SBA (Progressing)       Start:  09/09/24    Expected End:  09/23/24               Pain - Adult              Education Documentation  Body Mechanics, taught by Scarlett Correa, PT at 9/9/2024 11:36 AM.  Learner: Patient  Readiness: Acceptance  Method: Demonstration  Response: Needs Reinforcement    Mobility Training, taught by Scarlett Correa, PT at 9/9/2024 11:36 AM.  Learner: Patient  Readiness: Acceptance  Method: Demonstration  Response: Needs Reinforcement    Education Comments  No comments found.

## 2024-09-09 NOTE — PROGRESS NOTES
09/09/24 1212   Discharge Planning   Living Arrangements Children;Family members   Support Systems Family members;Children;Home care staff   Assistance Needed bathing/dressing, meals, meds, house keeping, and transportation   Type of Residence Private residence   Number of Stairs to Enter Residence 3   Number of Stairs Within Residence 0   Do you have animals or pets at home? No   Who is requesting discharge planning? Provider   Home or Post Acute Services In home services   Type of Home Care Services Home health aide   Expected Discharge Disposition  Services   Does the patient need discharge transport arranged? Yes   [facility] coordination needed? Yes   Has discharge transport been arranged? Yes   What day is the transport expected? 09/09/24   Financial Resource Strain   How hard is it for you to pay for the very basics like food, housing, medical care, and heating? Not hard   Housing Stability   In the last 12 months, was there a time when you were not able to pay the mortgage or rent on time? N   In the past 12 months, how many times have you moved where you were living? 0   At any time in the past 12 months, were you homeless or living in a shelter (including now)? N   Transportation Needs   In the past 12 months, has lack of transportation kept you from medical appointments or from getting medications? no   In the past 12 months, has lack of transportation kept you from meetings, work, or from getting things needed for daily living? No   Patient Choice   Provider Choice list and CMS website (https://medicare.gov/care-compare#search) for post-acute Quality and Resource Measure Data were provided and reviewed with: Family   Patient / Family choosing to utilize agency / facility established prior to hospitalization Yes     Met with pt and introduced myself as care coordinator and member of the Care Transitions team for discharge planning. Pt with history of severe dementia, assessment questions deferred to  her daughter Bernardo [phone]. Bernardo stated pt lives at home with her, Bernardo's daughter, and pt's son. Per Bernardo pt is total care for ADL's and uses a FWW for assistance with ambulation. Family provides 24/7 care and supervision at home. Bernardo denies any recent falls or home safety concerns. Pt's address, phone number, and contact information was verified. Pt denies any social or financial concerns at this time.    Home care: Yes (grand daughter is hired HHA) [facility]  Disciplines: [facility] x5 days/week, 2 hours in the morning, 2 hours at night.  Outpatient Therapy: Pt attends [facility], Monday-Friday [phone].  DME: walker, hospital bed, shower chair.  : Vanesa Sevilla # [phone]  PCP: Melinda Johnson MD  Transport to appts: [facility].  Pharm: [facility]  (denies issues affording/obtaining medications)    Discharge Planning: Pt presented to ED with c/o diarrhea, per attending she is medically ready for discharge. Bernardo aware PT is recommending moderate intensity therapy, she prefers for pt to continue outpatient therapy at [facility] vs home PT/OT. Bernardo request 3pm stretcher transport at time of discharge, transport requested via [facility]. Bernardo voiced no additional home going needs at time of discharge. Attending and nursing updated. Care coordinator will continue to follow for discharge planning needs.    Tommie Young RN  Transitional Care Coordinator (TCC)  [phone] or [phone]

## 2024-09-09 NOTE — NURSING NOTE
Dsicharge note  Patient received discharge instructions to take home and remote RN went over with patient's daughter Bernardo via phone call. Bernardo states she has no questions or concerns. Patient notified to follow up with  home care and previously scheduled appointments. There were no changes to patient's medication. Patient will receive assistance getting dressed. Anticipating IV to be removed intact. CCA Patient transport request will take patient to   her home. No belongings to return.  Ebony Patel, LIN, RN

## 2024-10-14 NOTE — DISCHARGE SUMMARY
Discharge Diagnosis  Elevated troponin    Issues Requiring Follow-Up    N/A    Discharge Meds     Medication List      CONTINUE taking these medications     ascorbic acid 500 mg tablet; Commonly known as: Vitamin C   aspirin 81 mg chewable tablet   Boost 0.04 gram- 1 kcal/mL liquid; Generic drug: food supplemt,   lactose-reduced   cholecalciferol 50 mcg (2,000 unit) capsule; Commonly known as: Vitamin   D-3   cranberry 500 mg capsule   docusate sodium 100 mg capsule; Commonly known as: Colace   lactobacillus acidophilus & bulgar 1 million cell chewable tablet;   Commonly known as: Lactinex   rosuvastatin 20 mg tablet; Commonly known as: Crestor       Test Results Pending At Discharge  N/A    Hospital Course    Elevated troponin  - EKG without acute ischemic changes  - No CP, SOB, N/V  - Mildly elevated, downtrending  - Telemetry  - Doubt ACS at this time  - PT/OT recommend moderate intensity therapy, will discharge to [place]     Hypoglycemia (resolved)  - BGL on arrival low, improved with PO intake     UTI  - UA +ve nitrites, bacteria  - Bactrim started in ED, continue    The patient will discharge to [facility] [place] in stable condition.    Pertinent Physical Exam At Time of Discharge    GENERAL APPEARANCE: A&Ox3, appears in no acute distress  HEAD: normocephalic, atraumatic  THROAT: Oral cavity and pharynx normal. No inflammation, swelling, exudate, or lesions.  NECK: Neck supple, non-tender without lymphadenopathy, masses or thyromegaly.  CARDIAC: Normal S1 and S2. No S3, S4 or murmurs. Rhythm is regular. There is no peripheral edema, cyanosis or pallor. Extremities are warm and well perfused. No carotid bruits.  LUNGS: Clear to auscultation bilaterally without rales, rhonchi, wheezing or diminished breath sounds.  ABDOMEN: Positive bowel sounds. Soft, nondistended, nontender. No guarding or rebound. No masses.  EXTREMITIES: No significant deformity or joint abnormality. No edema. Peripheral pulses intact. No  varicosities.  SKIN: Skin normal color, texture and turgor with no lesions or rash  PSYCHIATRIC: oriented to person, place, and time, good judgement and reason, without hallucinations, abnormal affect or abnormal behaviors during the examination. Patient is not suicidal.        Outpatient Follow-Up  Future Appointments   Date Time Provider Department Center   10/31/2024  9:45 AM Yvonne French MD [id number]         Eric Dickey MD

## 2024-10-31 ENCOUNTER — APPOINTMENT (OUTPATIENT)
Dept: DERMATOLOGY | Facility: CLINIC | Age: 84
End: 2024-10-31
Payer: MEDICARE

## 2025-04-03 ENCOUNTER — APPOINTMENT (OUTPATIENT)
Dept: DERMATOLOGY | Facility: CLINIC | Age: 85
End: 2025-04-03
Payer: MEDICARE

## 2025-07-30 ENCOUNTER — HOSPITAL ENCOUNTER (EMERGENCY)
Facility: HOSPITAL | Age: 85
Discharge: HOME | End: 2025-07-30
Attending: EMERGENCY MEDICINE
Payer: MEDICARE

## 2025-07-30 VITALS
WEIGHT: 125 LBS | HEART RATE: 64 BPM | TEMPERATURE: 97.4 F | BODY MASS INDEX: 21.34 KG/M2 | DIASTOLIC BLOOD PRESSURE: 72 MMHG | OXYGEN SATURATION: 99 % | RESPIRATION RATE: 16 BRPM | HEIGHT: 64 IN | SYSTOLIC BLOOD PRESSURE: 108 MMHG

## 2025-07-30 DIAGNOSIS — T73.0XXA HUNGRY, INITIAL ENCOUNTER: Primary | ICD-10-CM

## 2025-07-30 LAB
ANION GAP SERPL CALC-SCNC: 13 MMOL/L (ref 10–20)
BASOPHILS # BLD AUTO: 0.03 X10*3/UL (ref 0–0.1)
BASOPHILS NFR BLD AUTO: 0.6 %
BUN SERPL-MCNC: 13 MG/DL (ref 6–23)
CALCIUM SERPL-MCNC: 9.5 MG/DL (ref 8.6–10.6)
CHLORIDE SERPL-SCNC: 105 MMOL/L (ref 98–107)
CO2 SERPL-SCNC: 25 MMOL/L (ref 21–32)
CREAT SERPL-MCNC: 0.85 MG/DL (ref 0.5–1.05)
EGFRCR SERPLBLD CKD-EPI 2021: 68 ML/MIN/1.73M*2
EOSINOPHIL # BLD AUTO: 0.22 X10*3/UL (ref 0–0.4)
EOSINOPHIL NFR BLD AUTO: 4.7 %
ERYTHROCYTE [DISTWIDTH] IN BLOOD BY AUTOMATED COUNT: 14.4 % (ref 11.5–14.5)
GLUCOSE SERPL-MCNC: 72 MG/DL (ref 74–99)
HCT VFR BLD AUTO: 42.2 % (ref 36–46)
HGB BLD-MCNC: 14.4 G/DL (ref 12–16)
IMM GRANULOCYTES # BLD AUTO: 0.01 X10*3/UL (ref 0–0.5)
IMM GRANULOCYTES NFR BLD AUTO: 0.2 % (ref 0–0.9)
LYMPHOCYTES # BLD AUTO: 1.64 X10*3/UL (ref 0.8–3)
LYMPHOCYTES NFR BLD AUTO: 35 %
MAGNESIUM SERPL-MCNC: 2.56 MG/DL (ref 1.6–2.4)
MCH RBC QN AUTO: 31.2 PG (ref 26–34)
MCHC RBC AUTO-ENTMCNC: 34.1 G/DL (ref 32–36)
MCV RBC AUTO: 91 FL (ref 80–100)
MONOCYTES # BLD AUTO: 0.63 X10*3/UL (ref 0.05–0.8)
MONOCYTES NFR BLD AUTO: 13.4 %
NEUTROPHILS # BLD AUTO: 2.16 X10*3/UL (ref 1.6–5.5)
NEUTROPHILS NFR BLD AUTO: 46.1 %
NRBC BLD-RTO: 0 /100 WBCS (ref 0–0)
PLATELET # BLD AUTO: 110 X10*3/UL (ref 150–450)
POTASSIUM SERPL-SCNC: 4.3 MMOL/L (ref 3.5–5.3)
RBC # BLD AUTO: 4.62 X10*6/UL (ref 4–5.2)
SODIUM SERPL-SCNC: 139 MMOL/L (ref 136–145)
WBC # BLD AUTO: 4.7 X10*3/UL (ref 4.4–11.3)

## 2025-07-30 PROCEDURE — 36415 COLL VENOUS BLD VENIPUNCTURE: CPT

## 2025-07-30 PROCEDURE — 80048 BASIC METABOLIC PNL TOTAL CA: CPT

## 2025-07-30 PROCEDURE — 83735 ASSAY OF MAGNESIUM: CPT

## 2025-07-30 PROCEDURE — 99284 EMERGENCY DEPT VISIT MOD MDM: CPT | Performed by: EMERGENCY MEDICINE

## 2025-07-30 PROCEDURE — 99283 EMERGENCY DEPT VISIT LOW MDM: CPT | Performed by: EMERGENCY MEDICINE

## 2025-07-30 PROCEDURE — 99284 EMERGENCY DEPT VISIT MOD MDM: CPT

## 2025-07-30 PROCEDURE — 85025 COMPLETE CBC W/AUTO DIFF WBC: CPT

## 2025-07-30 ASSESSMENT — LIFESTYLE VARIABLES
HAVE PEOPLE ANNOYED YOU BY CRITICIZING YOUR DRINKING: NO
EVER HAD A DRINK FIRST THING IN THE MORNING TO STEADY YOUR NERVES TO GET RID OF A HANGOVER: NO
HAVE YOU EVER FELT YOU SHOULD CUT DOWN ON YOUR DRINKING: NO
EVER FELT BAD OR GUILTY ABOUT YOUR DRINKING: NO
TOTAL SCORE: 0

## 2025-07-30 ASSESSMENT — PAIN SCALES - GENERAL
PAINLEVEL_OUTOF10: 0 - NO PAIN
PAINLEVEL_OUTOF10: 0 - NO PAIN

## 2025-07-30 ASSESSMENT — PAIN - FUNCTIONAL ASSESSMENT: PAIN_FUNCTIONAL_ASSESSMENT: 0-10

## 2025-07-30 NOTE — ED PROVIDER NOTES
"  Emergency Department Provider Note       History of Present Illness     History provided by: Patient  Limitations to History: Dementia  External Records Reviewed with Brief Summary: Nursing home paperwork which showed PMHx but no information about current medical episode. Multiple attempts to reach the medical staff at the nursing home were made, however no medical staff responded    HPI:  Kate Shah is a 85 y.o. female with PMHx of HTN, HLD, Stroke, Dementia, unspecified renal disease who presents by EMS for \"hypotension and SOB\" per nursing home staff, further details were not provided. Upon arrival the patient complained only of hunger. She denied shortness of breath, nausea, vomiting, diarrhea, fever, weakness, chest pain, abdominal pain, changes in mentation, changes in vision. All other review of systems were negative. The patient could not convey why the nursing home sent her here. She believed she was at a scheduled doctor's appointment.    Physical Exam   Triage vitals:  T 36.5 °C (97.7 °F)  HR (!) 102  /65  RR 18  O2 99 % None (Room air)    Repeat vitals: unremarkable    General: Awake, alert, in no acute distress, Oriented to self and location only  Eyes: Gaze conjugate.  No scleral icterus or injection  HENT: Normo-cephalic, atraumatic. No stridor  CV: Regular rate, regular rhythm. Radial pulses 2+ bilaterally  Resp: Breathing non-labored, speaking in full sentences.  Clear to auscultation bilaterally  GI: Soft, non-distended, non-tender. No rebound or guarding.  MSK/Extremities: No gross bony deformities. Moving all extremities. Patient ambulating with a walker at her self reported baseline  Skin: Warm. Appropriate color  Neuro: Alert. Oriented. Face symmetric. Speech is fluent.  Gross strength and sensation intact in b/l UE and Les, Cranial Nerves intact, no pronator drift, heal to shin intact, multi step instructions followed, coordination intact  Psych: Appropriate mood and " affect      Medical Decision Making & ED Course   Medical Decision Makin y.o. female with PMHx of dementia presented by EMS for hypotension and SOB reported at her nursing home. Screening labs, vital signs, and physical were unremarkable for any acute medical processes given her PMHx throughout her time in the ED. She was able to ambulate and felt comfortable returning to her nursing home. She was found to be mildly hypoglycemic and given food.    Lack of any neurological symptoms make acute intracranial event unlikely  SOB was not observed in the ED  Anemia was not found  Absence of leukocytosis makes pneumonia unlikely  ECG was largely unchanged from baseline  ----    Differential diagnoses considered include but are not limited to: Hypotension, SOB, pneumonia, anemia, acute ischemic event, acute intracranial event    Social Determinants of Health which Significantly Impact Care: Social Determinants of Health which Significantly Impact Care: None identified     EKG Independent Interpretation: The EKG obtained at 14:48 was independently interpreted by myself. It demonstrates sinus rhythm with a ventricular rate of 58. normal axis. ST segments unremarkable for acute ischemic pathology. Largely unchanged compared to prior EKG from 5-sep-2024    Independent Result Review and Interpretation: Relevant laboratory and radiographic results were reviewed and independently interpreted by myself.  As necessary, they are commented on in the ED Course.    Chronic conditions affecting the patient's care: As documented above in [facility]    The patient was discussed with the following consultants/services: None    Care Considerations: As documented above in [facility]    ED Course:  ED Course as of 25   1713 Attending MDM:    Patient is an 85-year-old female with a history of COPD, dementia, and CVA here from her nursing facility reportedly for shortness of breath and hypotension.  On arrival the patient is  normotensive, nontachycardic, and saturating 99% on room air.  Patient does not have any acute complaints.  Lungs are clear to auscultation bilaterally with nonlabored respirations.  There are 2+ pulses in all 4 extremities without lower extremity edema.  Abdomen is soft, nontender, nondistended.  Attempts were made to contact the patient's nursing facility without success.  Basic screening labs will be obtained as the patient is a poor historian.    Leda Yanes MD   [BR]      ED Course User Index  [BR] Leda Yanes MD         Diagnoses as of 07/30/25 2051   Hungry, initial encounter       Disposition   As a result of the work-up, the patient was discharged home.  she was informed of her diagnosis and instructed to come back with any concerns or worsening of condition.  she and was agreeable to the plan as discussed above.  she was given the opportunity to ask questions.  All of the patient's questions were answered.    Procedures   Procedures    Patient seen and discussed with ED attending physician.    Darren Whelan MD  Emergency Medicine                                                       Darren Whelan MD  Resident  07/30/25 3065

## 2025-07-30 NOTE — DISCHARGE INSTRUCTIONS
Your vital signs and lab work were all as expected throughout your time in the ED. Your blood sugar was slightly low which can be fixed by eating.    We did not observe any hypotension or shortness of breath while you were here    Return to the hospital if hypotension or shortness of breath return  Ensure you are eating and drinking water frequently throughout the day  Return for nausea, vomiting, diarrhea  Return for chill or fever  Return for chest pain